# Patient Record
Sex: FEMALE | Race: WHITE | HISPANIC OR LATINO | Employment: FULL TIME | URBAN - NONMETROPOLITAN AREA
[De-identification: names, ages, dates, MRNs, and addresses within clinical notes are randomized per-mention and may not be internally consistent; named-entity substitution may affect disease eponyms.]

---

## 2017-02-15 ENCOUNTER — HOSPITAL ENCOUNTER (OUTPATIENT)
Dept: MAMMOGRAPHY | Facility: HOSPITAL | Age: 47
Discharge: HOME | End: 2017-02-15
Admitting: NURSE PRACTITIONER

## 2017-02-15 DIAGNOSIS — Z12.31 ENCOUNTER FOR SCREENING MAMMOGRAM FOR BREAST CANCER: Primary | ICD-10-CM

## 2017-02-15 DIAGNOSIS — Z12.31 ENCOUNTER FOR SCREENING MAMMOGRAM FOR MALIGNANT NEOPLASM OF BREAST: ICD-10-CM

## 2017-02-15 PROCEDURE — G0202 SCR MAMMO BI INCL CAD: HCPCS

## 2017-02-15 PROCEDURE — 77063 BREAST TOMOSYNTHESIS BI: CPT

## 2019-07-29 ENCOUNTER — TRANSCRIBE ORDERS (OUTPATIENT)
Dept: ADMINISTRATIVE | Facility: HOSPITAL | Age: 49
End: 2019-07-29

## 2019-07-29 DIAGNOSIS — Z12.39 BREAST SCREENING: Primary | ICD-10-CM

## 2019-11-19 ENCOUNTER — HOSPITAL ENCOUNTER (OUTPATIENT)
Dept: MAMMOGRAPHY | Facility: HOSPITAL | Age: 49
Discharge: HOME | End: 2019-11-19
Admitting: NURSE PRACTITIONER

## 2019-11-19 ENCOUNTER — OFFICE VISIT (OUTPATIENT)
Dept: OBSTETRICS AND GYNECOLOGY | Facility: CLINIC | Age: 49
End: 2019-11-19

## 2019-11-19 VITALS
DIASTOLIC BLOOD PRESSURE: 78 MMHG | WEIGHT: 276 LBS | SYSTOLIC BLOOD PRESSURE: 128 MMHG | BODY MASS INDEX: 47.12 KG/M2 | HEIGHT: 64 IN

## 2019-11-19 DIAGNOSIS — Z01.419 WELL WOMAN EXAM WITH ROUTINE GYNECOLOGICAL EXAM: Primary | ICD-10-CM

## 2019-11-19 DIAGNOSIS — Z12.4 CERVICAL CANCER SCREENING: ICD-10-CM

## 2019-11-19 DIAGNOSIS — Z12.39 BREAST SCREENING: ICD-10-CM

## 2019-11-19 PROCEDURE — 77063 BREAST TOMOSYNTHESIS BI: CPT

## 2019-11-19 PROCEDURE — G0123 SCREEN CERV/VAG THIN LAYER: HCPCS | Performed by: NURSE PRACTITIONER

## 2019-11-19 PROCEDURE — 87624 HPV HI-RISK TYP POOLED RSLT: CPT | Performed by: NURSE PRACTITIONER

## 2019-11-19 PROCEDURE — 77067 SCR MAMMO BI INCL CAD: CPT

## 2019-11-19 PROCEDURE — 99396 PREV VISIT EST AGE 40-64: CPT | Performed by: NURSE PRACTITIONER

## 2019-11-19 NOTE — PROGRESS NOTES
Chrissy Willis is a 49 y.o. female  YOB: 1970        Chief Complaint   Patient presents with   • Gynecologic Exam     Patient here for yearly exam. Last pap was done 12/13/16 which was normal. Patient had mammogram done 11/19/16 at [facility], which was normal. Patient has had a supracervical hysterectomy due to endometriosis and bleeding issues. Patient has c/o of MOLLY.        Gynecologic Exam   The patient's pertinent negatives include no pelvic pain. Pertinent negatives include no abdominal pain, back pain, constipation, diarrhea, dysuria, fever, frequency, hematuria, nausea, rash, sore throat, urgency or vomiting.       The following portions of the patient's history were reviewed and updated as appropriate: allergies, current medications, past family history, past medical history, past social history, past surgical history and problem list.    Allergies   Allergen Reactions   • Codeine Unknown - Low Severity   • Penicillins Unknown - Low Severity       Past Medical History:   Diagnosis Date   • Anxiety    • Diabetes mellitus (CMS/HCC)    • Hypertension        Family History   Problem Relation Age of Onset   • Breast cancer Mother    • Breast cancer Maternal Aunt    • Breast cancer Maternal Aunt    • Pancreatic cancer Maternal Uncle    • Ovarian cancer Neg Hx    • Colon cancer Neg Hx        Social History     Socioeconomic History   • Marital status:      Spouse name: Not on file   • Number of children: Not on file   • Years of education: Not on file   • Highest education level: Not on file   Tobacco Use   • Smoking status: Former Smoker   • Smokeless tobacco: Never Used   Substance and Sexual Activity   • Alcohol use: No   • Drug use: No         Current Outpatient Medications:   •  cetirizine (zyrTEC) 10 MG tablet, Take 10 mg by mouth Daily., Disp: , Rfl:   •  Cholecalciferol (VITAMIN D3) 10155 UNITS tablet, Take 1 tablet/day by mouth Daily., Disp: , Rfl:   •  FLUoxetine (PROzac)  40 MG capsule, , Disp: , Rfl:   •  metFORMIN (GLUCOPHAGE) 500 MG tablet, Take 1 tablet by mouth 2 (Two) Times a Day With Meals., Disp: 60 tablet, Rfl: 2  •  triamterene-hydrochlorothiazide (MAXZIDE-25) 37.5-25 MG per tablet, , Disp: , Rfl:     No LMP recorded. Patient has had a hysterectomy.    Sexual History:         Could not be calculated    Past Surgical History:   Procedure Laterality Date   • DILATATION AND CURETTAGE     • HYSTERECTOMY SUPRACERVICAL      due to endometriosis and bleeding.    • LAPAROSCOPIC SALPINGOOPHERECTOMY Left     still has right ovary       Review of Systems   Constitutional: Negative for activity change, appetite change, fatigue, fever, unexpected weight gain and unexpected weight loss.   HENT: Negative for congestion, ear pain, hearing loss, nosebleeds, rhinorrhea, sore throat, tinnitus and trouble swallowing.    Eyes: Negative for blurred vision, pain, discharge, itching and visual disturbance.   Respiratory: Negative for apnea, chest tightness, shortness of breath and wheezing.    Cardiovascular: Negative for chest pain and leg swelling.   Gastrointestinal: Negative for abdominal pain, blood in stool, constipation, diarrhea, nausea, vomiting and GERD.   Endocrine: Negative for heat intolerance, polydipsia and polyuria.   Genitourinary: Negative for breast lump, decreased libido, difficulty urinating, dyspareunia, dysuria, frequency, genital sores, hematuria, menstrual problem, pelvic pain, urgency, urinary incontinence and vaginal pain.   Musculoskeletal: Negative for arthralgias, back pain, joint swelling and myalgias.   Skin: Negative for color change, rash and skin lesions.   Allergic/Immunologic: Negative for environmental allergies, food allergies and immunocompromised state.   Neurological: Negative for dizziness, tremors, seizures, syncope, facial asymmetry, numbness and headache.   Hematological: Negative for adenopathy. Does not bruise/bleed easily.   Psychiatric/Behavioral:  Negative for agitation, hallucinations, sleep disturbance, suicidal ideas and depressed mood. The patient is not nervous/anxious.        Objective   Physical Exam   Constitutional: She is oriented to person, place, and time. She appears well-developed and well-nourished.   HENT:   Head: Normocephalic and atraumatic.   Right Ear: External ear normal.   Left Ear: External ear normal.   Eyes: Conjunctivae and EOM are normal. Right eye exhibits no discharge. Left eye exhibits no discharge. No scleral icterus.   Neck: Normal range of motion. Neck supple. Carotid bruit is not present. No thyromegaly present.   Cardiovascular: Regular rhythm and normal heart sounds.   No murmur heard.  Pulmonary/Chest: Effort normal and breath sounds normal. No respiratory distress. Right breast exhibits no inverted nipple, no mass, no nipple discharge, no skin change and no tenderness. Left breast exhibits no inverted nipple, no mass, no nipple discharge, no skin change and no tenderness. Breasts are symmetrical. There is no breast swelling.   Abdominal: Soft. Bowel sounds are normal. She exhibits no distension and no mass. There is no tenderness. There is no guarding. No hernia. Hernia confirmed negative in the right inguinal area and confirmed negative in the left inguinal area.   Genitourinary: Vagina normal. Rectal exam shows no mass. No breast tenderness, discharge or bleeding. There is no rash, tenderness, lesion or injury on the right labia. There is no rash, tenderness, lesion or injury on the left labia. No erythema or bleeding in the vagina. No signs of injury around the vagina. No vaginal discharge found.   Genitourinary Comments: Cervix, Uterus and Adnexa are surgically absent.  Urethra and urethral meatus normal  Bladder, normal no prolapse  Perineum and anus examined and without lesions   Musculoskeletal: Normal range of motion. She exhibits no edema or tenderness.   Lymphadenopathy:        Head (right side): No submental,  "no submandibular, no tonsillar, no preauricular, no posterior auricular and no occipital adenopathy present.        Head (left side): No submental, no submandibular, no tonsillar, no preauricular, no posterior auricular and no occipital adenopathy present.     She has no cervical adenopathy.        Right cervical: No superficial cervical, no deep cervical and no posterior cervical adenopathy present.       Left cervical: No superficial cervical, no deep cervical and no posterior cervical adenopathy present.     She has no axillary adenopathy.        Right: No inguinal adenopathy present.        Left: No inguinal adenopathy present.   Neurological: She is alert and oriented to person, place, and time. She exhibits normal muscle tone. Coordination normal.   Skin: Skin is warm and dry. No bruising and no rash noted. No erythema.   Psychiatric: She has a normal mood and affect. Her behavior is normal. Judgment and thought content normal.   Nursing note and vitals reviewed.        Vitals:    11/19/19 0953   BP: 128/78   Weight: 125 kg (276 lb)   Height: 162.6 cm (64\")       Radha was seen today for gynecologic exam.    Diagnoses and all orders for this visit:    Well woman exam with routine gynecological exam  Comments:  Normal well woman exam.  ThinPrep Pap smear done.  Mammogram was done this morning-results already in chart and is normal.  Orders:  -     Liquid-based Pap Smear, Screening    Cervical cancer screening  Comments:  ThinPrep Pap smear done.  Orders:  -     Liquid-based Pap Smear, Screening          Patient's Body mass index is 47.38 kg/m². BMI is above normal parameters. Recommendations include: exercise counseling and nutrition counseling.             Non-Smoker    MyChart Instructions Given       "

## 2019-11-21 LAB
GEN CATEG CVX/VAG CYTO-IMP: NORMAL
HPV I/H RISK 4 DNA CVX QL PROBE+SIG AMP: NOT DETECTED
LAB AP CASE REPORT: NORMAL
LAB AP GYN ADDITIONAL INFORMATION: NORMAL
PATH INTERP SPEC-IMP: NORMAL
STAT OF ADQ CVX/VAG CYTO-IMP: NORMAL

## 2020-01-15 NOTE — PROGRESS NOTES
Name:  Radha Willis  YOB: 1970  Location: [facility]  Location Address: [address] [place] [address], [facility], [address] [place] [address]  Location Phone: [phone]    Chief Complaint  Chief Complaint   Patient presents with   • Skin Lesion     right ear, scalp       History of Present Illness  The patient  is a 49 y.o. female who is referred by Wilda Ford MD for preoperative evaluation. She complains of a lesion of the right central forehead and the right inferior posterior helix present for >5 year(s). They have not been  biopsied.      She is without other complaints    I have personally reviewed the information imported into the chart during this visit.  Prior documentation that information has been reviewed in the imported note or documented material should not be taken to imply that review during this visit has not occurred.    I have personally reviewed the review of systems.                   Past Medical History:   Diagnosis Date   • Anxiety    • Diabetes mellitus (CMS/HCC)    • Hypertension        Past Surgical History:   Procedure Laterality Date   • DILATATION AND CURETTAGE     • HYSTERECTOMY SUPRACERVICAL      due to endometriosis and bleeding.    • LAPAROSCOPIC SALPINGOOPHERECTOMY Left     still has right ovary         Current Outpatient Medications:   •  cetirizine (zyrTEC) 10 MG tablet, Take 10 mg by mouth Daily., Disp: , Rfl:   •  Cholecalciferol (VITAMIN D3) 66258 UNITS tablet, Take 1 tablet/day by mouth Daily., Disp: , Rfl:   •  FLUoxetine (PROzac) 40 MG capsule, , Disp: , Rfl:   •  metFORMIN (GLUCOPHAGE) 500 MG tablet, Take 1 tablet by mouth 2 (Two) Times a Day With Meals., Disp: 60 tablet, Rfl: 2  •  triamterene-hydrochlorothiazide (MAXZIDE-25) 37.5-25 MG per tablet, , Disp: , Rfl:     Codeine and Penicillins    Family History   Problem Relation Age of Onset   • Breast cancer Mother    • Cancer Mother    • Diabetes Mother    • Breast cancer Maternal Aunt    •  Breast cancer Maternal Aunt    • Pancreatic cancer Maternal Uncle    • Ovarian cancer Neg Hx    • Colon cancer Neg Hx        Social History     Socioeconomic History   • Marital status:      Spouse name: Not on file   • Number of children: Not on file   • Years of education: Not on file   • Highest education level: Not on file   Tobacco Use   • Smoking status: Former Smoker     Last attempt to quit:      Years since quittin.0   • Smokeless tobacco: Never Used   Substance and Sexual Activity   • Alcohol use: No   • Drug use: No       Review of Systems   Constitutional: Negative.    HENT: Negative.    Eyes: Negative.    Respiratory: Negative.    Cardiovascular: Negative.    Gastrointestinal: Negative.    Endocrine: Negative.    Genitourinary: Negative.    Musculoskeletal: Negative.    Skin:        lesion of the right central forehead and the right inferior posterior helix    Allergic/Immunologic: Negative.    Hematological: Negative.    Psychiatric/Behavioral: Negative.        Vitals:    20 1035   BP: 132/70   Pulse: 93   Temp: 96.6 °F (35.9 °C)       Objective     Physical Exam  CONSTITUTIONAL: well nourished, well-developed, alert, oriented, in no acute distress     COMMUNICATION AND VOICE: able to communicate normally, normal voice quality    HEAD: normocephalic, 5 x 4 mm round papule right frontal scalp, atraumatic, no tenderness, no masses     FACE: appearance normal, no lesions, no tenderness, no deformities, facial motion symmetric    EYES: ocular motility normal, eyelids normal, orbits normal, no proptosis, conjunctiva normal , pupils equal, round     EARS:  Hearing: hearing to conversational voice intact bilaterally   External Ears: normal bilaterally, 4 x 3 mm palpable pigmented lesion right inferior helical rim    NOSE:  External Nose: external nasal structure normal, no tenderness on palpation, no nasal discharge, no lesions, no evidence of trauma, nostrils patent     ORAL:  Lips:  "upper and lower lips without lesion     NECK:  Inspection and Palpation: neck appearance normal, no masses or tenderness    CHEST/RESPIRATORY: normal respiratory effort     CARDIOVASCULAR: no cyanosis or edema     NEUROLOGICAL/PSYCHIATRIC: oriented to time, place and person, mood normal, affect appropriate, CN II-XII intact grossly       Assessment/Plan   Radha was seen today for skin lesion.    Diagnoses and all orders for this visit:    Neoplasm of uncertain behavior  Comments:  Right frontal scalp  Right inferior helical rim      * Surgery not found *  No orders of the defined types were placed in this encounter.    Return for postop.       Patient Instructions   The risks, benefits and options of the procedure were explained to the patient. The possiblity of a persistent cosmetic defect as well as a \"flap\" or a graft to close the defect was discussed. The patient was instructed to stop all aspirin, NSAIDs and VIT E etc.  If appropriate, clearance with primary MD or specialist will be obtained preoperatively.  The patient was scheduled for a LOCAL in the office.    For instructions on the proper use, care and disposal of controled substances, ask you doctor or refer to the [place] Board of Medicine website: http://kbml.[place].gov/hb1/Pages/Considerations-For-Patient-Education.aspx            "

## 2020-01-16 ENCOUNTER — TELEPHONE (OUTPATIENT)
Dept: OTOLARYNGOLOGY | Facility: CLINIC | Age: 50
End: 2020-01-16

## 2020-01-16 ENCOUNTER — OFFICE VISIT (OUTPATIENT)
Dept: OTOLARYNGOLOGY | Facility: CLINIC | Age: 50
End: 2020-01-16

## 2020-01-16 VITALS
SYSTOLIC BLOOD PRESSURE: 132 MMHG | HEIGHT: 64 IN | HEART RATE: 93 BPM | WEIGHT: 278.6 LBS | TEMPERATURE: 96.6 F | BODY MASS INDEX: 47.56 KG/M2 | DIASTOLIC BLOOD PRESSURE: 70 MMHG

## 2020-01-16 DIAGNOSIS — D48.9 NEOPLASM OF UNCERTAIN BEHAVIOR: Primary | ICD-10-CM

## 2020-01-16 PROCEDURE — 99203 OFFICE O/P NEW LOW 30 MIN: CPT | Performed by: OTOLARYNGOLOGY

## 2020-01-16 NOTE — TELEPHONE ENCOUNTER
----- Message from Flory Atkinson RN sent at 1/16/2020 11:37 AM CST -----  Lm about ear lesion with marisabel

## 2020-03-03 ENCOUNTER — PROCEDURE VISIT (OUTPATIENT)
Dept: OTOLARYNGOLOGY | Facility: CLINIC | Age: 50
End: 2020-03-03

## 2020-03-03 DIAGNOSIS — D48.9 NEOPLASM OF UNCERTAIN BEHAVIOR: Primary | ICD-10-CM

## 2020-03-03 PROCEDURE — 11442 EXC FACE-MM B9+MARG 1.1-2 CM: CPT | Performed by: OTOLARYNGOLOGY

## 2020-03-03 PROCEDURE — 11421 EXC H-F-NK-SP B9+MARG 0.6-1: CPT | Performed by: OTOLARYNGOLOGY

## 2020-03-03 PROCEDURE — 13120 CMPLX RPR S/A/L 1.1-2.5 CM: CPT | Performed by: OTOLARYNGOLOGY

## 2020-03-03 PROCEDURE — 13151 CMPLX RPR E/N/E/L 1.1-2.5 CM: CPT | Performed by: OTOLARYNGOLOGY

## 2020-03-03 NOTE — PROGRESS NOTES
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  Following the injection of local anesthesia circumferential elliptical excision was accomplished of the lesion of the right inferior helical rim without difficulty utilizing a #15 blade.  Excision was 1.4 cm x 0.5 cm.  The lesion was 5 x 4 mm.  The margin was 0.5 mm. Wide undermining was performed with curved iris scissors in order to facilitate closure and preserve normal anatomic relationships.  There was minimal to no bleeding.    Reapproximation was accomplished with interrupted 5-0 nylon.    Attention was then turned to the right frontal scalp lesion.  Following injection an elliptical excision was accomplished of the anterior frontal scalp lesion without difficulty utilizing #15 blade.  Excision was approximately 1.9 cm x 0.7 cm the lesion was 5 x 6 mm.  The margin was 1 mm.  Wide undermining was performed with curved iris scissors in order to facilitate closure and preserve normal anatomic relationships.  Reapproximation was accomplished with interrupted 4-0 Vicryl subcutaneously and interrupted horizontal mattress of 5-0 nylon to reapproximate the epidermis..    Bacitracin ointment was applied and the procedure terminated.  The patient tolerated the procedure well. There were no complications.

## 2020-03-13 ENCOUNTER — PROCEDURE VISIT (OUTPATIENT)
Dept: OTOLARYNGOLOGY | Facility: CLINIC | Age: 50
End: 2020-03-13

## 2020-03-13 DIAGNOSIS — Z48.02 VISIT FOR SUTURE REMOVAL: Primary | ICD-10-CM

## 2020-03-13 PROCEDURE — 99024 POSTOP FOLLOW-UP VISIT: CPT | Performed by: PHYSICIAN ASSISTANT

## 2020-03-13 NOTE — PROGRESS NOTES
DUNIA Gomez   History Of Present Illness:  Radha Willis is a 50 y.o. female who presents for suture removal after surgery.    Physical exam:  sutures removed, wound healing appropriately without overt infection or inflammation        Assessment:  Postoperative Examination Following Surgery V67.00    Plan:  call for wound redness, swelling or breakdown   Wound care instructions given     DUNIA Gomez  3/13/2020   09:52

## 2020-03-17 ENCOUNTER — TELEPHONE (OUTPATIENT)
Dept: OTOLARYNGOLOGY | Facility: CLINIC | Age: 50
End: 2020-03-17

## 2020-09-18 ENCOUNTER — OFFICE VISIT (OUTPATIENT)
Age: 50
End: 2020-09-18

## 2020-09-18 VITALS — HEART RATE: 83 BPM | OXYGEN SATURATION: 98 % | TEMPERATURE: 97.1 F

## 2020-09-18 PROCEDURE — 99999 PR OFFICE/OUTPT VISIT,PROCEDURE ONLY: CPT | Performed by: NURSE PRACTITIONER

## 2020-09-21 LAB — SARS-COV-2, NAA: NOT DETECTED

## 2022-04-01 ENCOUNTER — TELEPHONE (OUTPATIENT)
Dept: OBSTETRICS AND GYNECOLOGY | Facility: CLINIC | Age: 52
End: 2022-04-01

## 2022-04-19 DIAGNOSIS — Z12.31 ENCOUNTER FOR SCREENING MAMMOGRAM FOR BREAST CANCER: Primary | ICD-10-CM

## 2022-06-01 ENCOUNTER — HOSPITAL ENCOUNTER (OUTPATIENT)
Dept: MAMMOGRAPHY | Facility: HOSPITAL | Age: 52
Discharge: HOME | End: 2022-06-01
Admitting: NURSE PRACTITIONER

## 2022-06-01 ENCOUNTER — DOCUMENTATION (OUTPATIENT)
Dept: MAMMOGRAPHY | Facility: HOSPITAL | Age: 52
End: 2022-06-01

## 2022-06-01 DIAGNOSIS — Z12.31 ENCOUNTER FOR SCREENING MAMMOGRAM FOR BREAST CANCER: ICD-10-CM

## 2022-06-01 DIAGNOSIS — Z80.3 FAMILY HISTORY OF BREAST CANCER: Primary | ICD-10-CM

## 2022-06-01 PROCEDURE — 77067 SCR MAMMO BI INCL CAD: CPT

## 2022-06-01 PROCEDURE — 77063 BREAST TOMOSYNTHESIS BI: CPT

## 2022-06-01 NOTE — PROGRESS NOTES
As part of a high-risk assessment, this patient was provided a questionnaire to assess personal and family history of cancer. Patients who meet National [employer] criteria are offered genetic testing for hereditary cancer at their appointment. If this patient qualifies and consents to genetic testing, the results and management recommendations (when applicable) will be provided to the referring provider. A [facility] breast cancer risk assessment was also calculated.  Based upon the patient's answers, the TC score was calcuated as  21.0% . Women with a 20% or higher lifetime risk are recommended to pursue annual breast MRI in addition to annual mammogram, per [employer] recommendations.

## 2022-06-08 ENCOUNTER — LAB (OUTPATIENT)
Dept: LAB | Facility: HOSPITAL | Age: 52
End: 2022-06-08

## 2022-06-08 DIAGNOSIS — Z80.3 FAMILY HISTORY OF BREAST CANCER: ICD-10-CM

## 2022-06-22 ENCOUNTER — DOCUMENTATION (OUTPATIENT)
Dept: LAB | Facility: HOSPITAL | Age: 52
End: 2022-06-22

## 2022-06-22 NOTE — PROGRESS NOTES
The results from genetic lab testing for hereditary cancer were routed to provider.

## 2023-03-15 ENCOUNTER — OFFICE VISIT (OUTPATIENT)
Dept: OBSTETRICS AND GYNECOLOGY | Facility: CLINIC | Age: 53
End: 2023-03-15
Payer: COMMERCIAL

## 2023-03-15 VITALS
BODY MASS INDEX: 49.51 KG/M2 | WEIGHT: 290 LBS | DIASTOLIC BLOOD PRESSURE: 100 MMHG | SYSTOLIC BLOOD PRESSURE: 138 MMHG | HEIGHT: 64 IN

## 2023-03-15 DIAGNOSIS — N95.1 PERIMENOPAUSAL VASOMOTOR SYMPTOMS: ICD-10-CM

## 2023-03-15 DIAGNOSIS — Z12.4 CERVICAL CANCER SCREENING: ICD-10-CM

## 2023-03-15 DIAGNOSIS — Z91.89 INCREASED RISK OF BREAST CANCER: ICD-10-CM

## 2023-03-15 DIAGNOSIS — Z12.31 ENCOUNTER FOR SCREENING MAMMOGRAM FOR MALIGNANT NEOPLASM OF BREAST: ICD-10-CM

## 2023-03-15 DIAGNOSIS — Z01.419 WELL WOMAN EXAM WITH ROUTINE GYNECOLOGICAL EXAM: Primary | ICD-10-CM

## 2023-03-15 PROCEDURE — 99386 PREV VISIT NEW AGE 40-64: CPT | Performed by: NURSE PRACTITIONER

## 2023-03-15 PROCEDURE — G0123 SCREEN CERV/VAG THIN LAYER: HCPCS | Performed by: NURSE PRACTITIONER

## 2023-03-15 PROCEDURE — 87624 HPV HI-RISK TYP POOLED RSLT: CPT | Performed by: NURSE PRACTITIONER

## 2023-03-15 PROCEDURE — 99213 OFFICE O/P EST LOW 20 MIN: CPT | Performed by: NURSE PRACTITIONER

## 2023-03-15 RX ORDER — LOVASTATIN 20 MG/1
TABLET ORAL
COMMUNITY
Start: 2022-12-28

## 2023-03-15 RX ORDER — OMEPRAZOLE 20 MG/1
CAPSULE, DELAYED RELEASE ORAL
COMMUNITY
Start: 2022-12-28

## 2023-03-15 RX ORDER — LOSARTAN POTASSIUM 50 MG/1
TABLET ORAL
COMMUNITY
Start: 2022-12-28

## 2023-03-15 NOTE — PROGRESS NOTES
Subjective   Radha Willis is a 53 y.o. female  YOB: 1970        Chief Complaint   Patient presents with   • Gynecologic Exam     New patient here for annual, patient has had a hysterectomy (patient states that she has part of her cervix and 1 ovary), last mammogram 22, last colonoscopy 2022. Patient denies any problems or concerns       Gynecologic Exam  The patient's pertinent negatives include no pelvic pain. Pertinent negatives include no abdominal pain, back pain, constipation, diarrhea, dysuria, fever, frequency, hematuria, nausea, rash, sore throat, urgency or vomiting.       The following portions of the patient's history were reviewed and updated as appropriate: allergies, current medications, past family history, past medical history, past social history, past surgical history, and problem list.    Allergies   Allergen Reactions   • Shellfish-Derived Products Anaphylaxis   • Codeine Unknown - Low Severity   • Lisinopril Unknown - High Severity   • Penicillins Unknown - Low Severity       Past Medical History:   Diagnosis Date   • Anxiety    • Diabetes mellitus (HCC)    • Endometriosis    • Female infertility    • Hypertension    • Migraine    • Ovarian cyst    • Polycystic ovary syndrome    • Rh incompatibility        Family History   Problem Relation Age of Onset   • Breast cancer Mother    • Cancer Mother    • Diabetes Mother    • Breast cancer Maternal Aunt    • Breast cancer Maternal Aunt    • Pancreatic cancer Maternal Uncle    • Colon cancer Maternal Uncle         Agent Orange related   • Diabetes Maternal Uncle    • Colon cancer Maternal Uncle    • Prostate cancer Maternal Uncle    • Ovarian cancer Neg Hx        Social History     Socioeconomic History   • Marital status:    Tobacco Use   • Smoking status: Former     Packs/day: 0.50     Years: 20.00     Pack years: 10.00     Types: Cigarettes     Quit date: 2009     Years since quittin.9   •  Smokeless tobacco: Never   Substance and Sexual Activity   • Alcohol use: No   • Drug use: No   • Sexual activity: Yes     Partners: Male     Birth control/protection: Hysterectomy         Current Outpatient Medications:   •  cetirizine (zyrTEC) 10 MG tablet, Take 1 tablet by mouth Daily., Disp: , Rfl:   •  Cholecalciferol (VITAMIN D3) 58543 UNITS tablet, Take 1 tablet by mouth Daily., Disp: , Rfl:   •  FLUoxetine (PROzac) 40 MG capsule, , Disp: , Rfl:   •  losartan (COZAAR) 50 MG tablet, TAKE 1 TABLET BY MOUTH EVERY DAY FOR 90 DAYS, Disp: , Rfl:   •  lovastatin (MEVACOR) 20 MG tablet, TAKE 1 TABLET BY MOUTH EVERY DAY WITH THE EVENING MEAL FOR 90 DAYS, Disp: , Rfl:   •  metFORMIN (GLUCOPHAGE) 500 MG tablet, Take 1 tablet by mouth 2 (Two) Times a Day With Meals., Disp: 60 tablet, Rfl: 2  •  omeprazole (priLOSEC) 20 MG capsule, TAKE 1 CAPSULE BY MOUTH EVERY DAY 30 MINUTES BEFORE MORNING MEAL FOR 90 DAYS, Disp: , Rfl:   •  Ubiquinol 100 MG capsule, See Admin Instructions., Disp: , Rfl:     No LMP recorded. Patient has had a hysterectomy.    Sexual History:           Could not be calculated    Past Surgical History:   Procedure Laterality Date   • D & C WITH SUCTION  1999   • HYSTERECTOMY SUPRACERVICAL      due to endometriosis and bleeding.    • LAPAROSCOPIC SALPINGOOPHERECTOMY Left     still has right ovary   • OVARIAN CYST SURGERY  1993    right ovary   • TOTAL ABDOMINAL HYSTERECTOMY  2007       Review of Systems   Constitutional: Negative for activity change, appetite change, fatigue, fever, unexpected weight gain and unexpected weight loss.   HENT: Negative for congestion, ear pain, hearing loss, nosebleeds, rhinorrhea, sore throat, tinnitus and trouble swallowing.    Eyes: Negative for blurred vision, pain, discharge, itching and visual disturbance.   Respiratory: Negative for apnea, chest tightness, shortness of breath and wheezing.    Cardiovascular: Negative for chest pain and leg swelling.   Gastrointestinal:  Negative for abdominal pain, blood in stool, constipation, diarrhea, nausea, vomiting and GERD.   Endocrine: Negative for heat intolerance, polydipsia and polyuria.   Genitourinary: Negative.  Negative for breast lump, decreased libido, difficulty urinating, dyspareunia, dysuria, frequency, genital sores, hematuria, menstrual problem, pelvic pain, urgency, urinary incontinence and vaginal pain.   Musculoskeletal: Negative for arthralgias, back pain, joint swelling and myalgias.   Skin: Negative for color change, rash and skin lesions.   Allergic/Immunologic: Negative for environmental allergies, food allergies and immunocompromised state.   Neurological: Negative for dizziness, tremors, seizures, syncope, facial asymmetry, numbness and headache.   Hematological: Negative for adenopathy. Does not bruise/bleed easily.   Psychiatric/Behavioral: Negative for agitation, hallucinations, sleep disturbance, suicidal ideas and depressed mood. The patient is not nervous/anxious.        Objective   Physical Exam  Vitals and nursing note reviewed.   Constitutional:       Appearance: She is well-developed.   HENT:      Head: Normocephalic and atraumatic.      Right Ear: External ear normal.      Left Ear: External ear normal.   Eyes:      General: No scleral icterus.        Right eye: No discharge.         Left eye: No discharge.      Conjunctiva/sclera: Conjunctivae normal.   Neck:      Thyroid: No thyromegaly.      Vascular: No carotid bruit.   Cardiovascular:      Rate and Rhythm: Regular rhythm.      Heart sounds: Normal heart sounds. No murmur heard.  Pulmonary:      Effort: Pulmonary effort is normal. No respiratory distress.      Breath sounds: Normal breath sounds.   Chest:   Breasts:     Breasts are symmetrical.      Right: No inverted nipple, mass, nipple discharge, skin change or tenderness.      Left: No inverted nipple, mass, nipple discharge, skin change or tenderness.   Abdominal:      General: Bowel sounds are  "normal. There is no distension.      Palpations: Abdomen is soft. There is no mass.      Tenderness: There is no abdominal tenderness. There is no guarding.      Hernia: No hernia is present. There is no hernia in the left inguinal area.   Genitourinary:     Labia:         Right: No rash, tenderness, lesion or injury.         Left: No rash, tenderness, lesion or injury.       Vagina: Normal. No signs of injury. No vaginal discharge, erythema or bleeding.      Rectum: No mass.      Comments: Cervix, Uterus and Adnexa are surgically absent.  Urethra and urethral meatus normal  Bladder, normal no prolapse  Perineum and anus examined and without lesions  Musculoskeletal:         General: No tenderness. Normal range of motion.      Cervical back: Normal range of motion and neck supple.   Lymphadenopathy:      Head:      Right side of head: No submental, submandibular, tonsillar, preauricular, posterior auricular or occipital adenopathy.      Left side of head: No submental, submandibular, tonsillar, preauricular, posterior auricular or occipital adenopathy.      Cervical: No cervical adenopathy.      Right cervical: No superficial, deep or posterior cervical adenopathy.     Left cervical: No superficial, deep or posterior cervical adenopathy.   Skin:     General: Skin is warm and dry.      Findings: No bruising, erythema or rash.   Neurological:      Mental Status: She is alert and oriented to person, place, and time.      Motor: No abnormal muscle tone.      Coordination: Coordination normal.   Psychiatric:         Behavior: Behavior normal.         Thought Content: Thought content normal.         Judgment: Judgment normal.           Vitals:    03/15/23 0831   BP: 138/100   BP Location: Left arm   Patient Position: Sitting   Cuff Size: Large Adult   Weight: 132 kg (290 lb)   Height: 162.6 cm (64\")       Diagnoses and all orders for this visit:    1. Well woman exam with routine gynecological exam " (Primary)  Comments:  Normal well woman exam.  History of supracervical hysterectomy.  ThinPrep Pap smear done.  Mammogram ordered.      2. Increased risk of breast cancer  Comments:  Referral to general surgery made.  Orders:  -     Ambulatory Referral to General Surgery    3. Cervical cancer screening  Comments:  ThinPrep Pap smear done.    4. Encounter for screening mammogram for malignant neoplasm of breast  Comments:  Mammogram ordered.  Orders:  -     Mammo Screening Digital Tomosynthesis Bilateral With CAD; Future    5. Perimenopausal vasomotor symptoms  Comments:  Patient reports hot flashes and night sweats.  History of hysterectomy.  Hormone panel done [id number] pending results.  Orders:  -     Cortisol  -     DHEA-Sulfate  -     Estradiol  -     Follicle Stimulating Hormone  -     Luteinizing Hormone  -     Progesterone  -     Testosterone        Normal GYN exam. Will have lab work here. Encouraged SBE.  Pt is aware how to do self breast exam and the importance of same. Discussed weight management and importance of maintaining a healthy weight. Discussed Vitamin D intake and the importance of adequate vitamin D for both bone health and a healthy immune system.  Discussed daily exercise and the importance of same in regards to a healthy heart as well as helping to maintain her weight and improving her mental health.  Body mass index is 49.78 kg/m². Colonoscopy is up to date.  Mammogram will be scheduled at [facility]. Pap smear is done per ASCCP guidelines.    Class 3 Severe Obesity (BMI >=40). Obesity-related health conditions include the following: hypertension and diabetes mellitus. Obesity is unchanged. BMI is is above average; BMI management plan is completed. We discussed portion control and increasing exercise.             Non-Smoker    MyChart Instructions Given

## 2023-03-16 LAB
CORTIS SERPL-MCNC: 12 UG/DL
DHEA-S SERPL-MCNC: 64.6 UG/DL (ref 41.2–243.7)
ESTRADIOL SERPL-MCNC: 27.4 PG/ML
FSH SERPL-ACNC: 37.3 MIU/ML
LH SERPL-ACNC: 23.6 MIU/ML
PROGEST SERPL-MCNC: <0.1 NG/ML
TESTOST SERPL-MCNC: 33 NG/DL (ref 4–50)

## 2023-03-17 LAB
GEN CATEG CVX/VAG CYTO-IMP: NORMAL
HPV I/H RISK 4 DNA CVX QL PROBE+SIG AMP: NOT DETECTED
LAB AP CASE REPORT: NORMAL
LAB AP GYN ADDITIONAL INFORMATION: NORMAL
Lab: NORMAL
PATH INTERP SPEC-IMP: NORMAL
STAT OF ADQ CVX/VAG CYTO-IMP: NORMAL

## 2023-03-21 NOTE — PROGRESS NOTES
Office New Patient History and Physical:     Referring Provider: Yahaira Forman AP*    Chief complaint: High risk of breast cancer discussion     Subjective .     History of present illness:  Radha Willis is a 53 y.o. female who is here to discuss her elevated risk of breast cancer.     Breast History information:   Tyrer-Cuzick score: 34.5%  Prior abnormal mammograms: none   Prior breast biopsies: none   Palpable breast masses: none   Nipple discharge: none   Age at first menses: 14  Age at menopause: hysterectomy in 2017, one ovary in place   Number of biological children: 0  Age at first birth: n/a   Years on birth control: 25 stella years   Years on HRT: < 6 months   Family history of breast cancer: Mother with breast cancer at age 60, maternal aunts x 2 with breast cancer around age 60   Smoking History: none   Alcohol use: none   BMI: 49     She is not on blood thinners.     History  Past Medical History:   Diagnosis Date   • Anxiety    • Diabetes mellitus (HCC)    • Endometriosis 1987   • Female infertility    • Hypertension    • Migraine    • Ovarian cyst 1987   • Polycystic ovary syndrome    • Rh incompatibility    ,   Past Surgical History:   Procedure Laterality Date   • D & C WITH SUCTION  1999   • HYSTERECTOMY SUPRACERVICAL      due to endometriosis and bleeding.    • LAPAROSCOPIC SALPINGOOPHERECTOMY Left     still has right ovary   • OVARIAN CYST SURGERY  1993    right ovary   • TOTAL ABDOMINAL HYSTERECTOMY  2007   ,   Family History   Problem Relation Age of Onset   • Breast cancer Mother    • Cancer Mother    • Diabetes Mother    • Breast cancer Maternal Aunt    • Breast cancer Maternal Aunt    • Pancreatic cancer Maternal Uncle    • Colon cancer Maternal Uncle         Agent Orange related   • Diabetes Maternal Uncle    • Colon cancer Maternal Uncle    • Prostate cancer Maternal Uncle    • Ovarian cancer Neg Hx    ,   Social History     Tobacco Use   • Smoking status: Former     Packs/day: 0.50  "    Years: 20.00     Pack years: 10.00     Types: Cigarettes     Quit date: 2009     Years since quittin.9   • Smokeless tobacco: Never   Substance Use Topics   • Alcohol use: No   • Drug use: No   , (Not in a hospital admission)   and Allergies:  Shellfish-derived products, Codeine, Lisinopril, and Penicillins    Current Outpatient Medications:   •  cetirizine (zyrTEC) 10 MG tablet, Take 1 tablet by mouth Daily., Disp: , Rfl:   •  Cholecalciferol (VITAMIN D3) 32536 UNITS tablet, Take 1 tablet by mouth Daily., Disp: , Rfl:   •  FLUoxetine (PROzac) 40 MG capsule, , Disp: , Rfl:   •  losartan (COZAAR) 50 MG tablet, TAKE 1 TABLET BY MOUTH EVERY DAY FOR 90 DAYS, Disp: , Rfl:   •  lovastatin (MEVACOR) 20 MG tablet, TAKE 1 TABLET BY MOUTH EVERY DAY WITH THE EVENING MEAL FOR 90 DAYS, Disp: , Rfl:   •  metFORMIN (GLUCOPHAGE) 500 MG tablet, Take 1 tablet by mouth 2 (Two) Times a Day With Meals., Disp: 60 tablet, Rfl: 2  •  omeprazole (priLOSEC) 20 MG capsule, TAKE 1 CAPSULE BY MOUTH EVERY DAY 30 MINUTES BEFORE MORNING MEAL FOR 90 DAYS, Disp: , Rfl:   •  Ubiquinol 100 MG capsule, See Admin Instructions., Disp: , Rfl:     Objective     Vital Signs   /100 (BP Location: Left arm, Patient Position: Sitting, Cuff Size: Adult)   Pulse 97   Ht 162.6 cm (64.02\")   Wt 132 kg (291 lb 0.1 oz)   SpO2 97%   BMI 49.93 kg/m²      Physical Exam:  General appearance - alert, well appearing, and in no distress  Mental status - alert, oriented to person, place, and time  Eyes - pupils equal and reactive, extraocular eye movements intact  Neck - supple, no significant adenopathy  Chest - no tachypnea, retractions or cyanosis  Heart - normal rate and regular rhythm  Abdomen - soft, nontender, nondistended, no masses or organomegaly  Neurological - alert, oriented, normal speech, no focal findings or movement disorder noted  Musculoskeletal - no joint tenderness, deformity or swelling  Breast Exam: Bilateral breasts without " obvious deformities. Bilateral nipples everted. Patient examined in the supine position with the ipsilateral arm above the head. Very dense breasts. No palpable masses bilaterally. No nipple discharge bilaterally. No palpable axillary nor supraclavicular adenopathy bilaterally.     Results Review:    The following data was reviewed by: Kelly Rogers MD on 03/22/2023:    Progress Notes by Mary Aquino R.T.(RICHARD) (06/01/2022 12:34)  Progress Notes by Mary Aquino R.T.(RICHARD) (06/22/2022 07:17)  Variant of unknown significance detected   Mammo Screening Digital Tomosynthesis Bilateral With CAD (06/01/2022 09:30)  No mammographic evidence of malignancy. Recommendation is for the  patient to return for routine mammography in one year or sooner if  clinically indicated.     Tyrer-Cuzick lifetime risk score of 34.5 percent.  NCCN guidelines for  genetic testing met. American Cancer Society guidelines recommend  surveillance screening MRI for women with estimated lifetime risk of  breast cancer greater than 20 percent.       BIRADS Category 2 - Benign findings    Assessment & Plan       Diagnoses and all orders for this visit:    1. Family history of breast cancer (Primary)  -     MRI Breast Bilateral With & Without Contrast; Future  -     Mammo Screening Bilateral With CAD; Future    2. At increased risk of breast cancer  -     MRI Breast Bilateral With & Without Contrast; Future  -     Mammo Screening Bilateral With CAD; Future    3. Obesity, Class III, BMI 40-49.9 (morbid obesity) (HCC)         Radha Willis is a 53 y.o. female with a high risk of breast cancer based on her Tyrer Cuzick score of 34.5%. 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  I went over risk reduction and early detection strategies with her. (1) Early detection: twice yearly clinical breast exam. Yearly bilateral breast MRI and yearly bilateral mammogram so there is imaging every 6 months. (2) Prevention: First step of  prevention is lifestyle modification with healthy diet, maintaining a healthy BMI, smoking cessation (including vaping), regular exercise, <2 alcoholic drinks per day. I highly encouraged smoking cessation to decrease her risk. The second option on prevention is chemoprophylaxis with anti-hormone therapy. We discussed the potential 30-40% risk reduction with medication. I offered her a referral to medical oncology to discuss risks and benefits in detail and she elected to defer at this time. Third option is surgical prevention with bilateral prophylactic mastectomies with or without reconstruction. We discussed that this is a big operation and will require 1-2 months of recovery with risks of bleeding, infection, damage to surrounding structures. She understands that bilateral mastectomy does not decrease her risk to 0%. She has elected to defer at this time.     She sees her obgyn in March. I will see her in September after her mammogram. High risk screening with alternating MRI and mammogram. MRI now - I will call her with the results. Mammogram ordered for 6 months from now.     I have reviewed mammogram and genetic testing above. I have ordered her MRI and mammogram.     Class 3 Severe Obesity (BMI >=40). Obesity-related health conditions include the following: hypertension, dyslipidemias and GERD. Obesity is unchanged. BMI is is above average; BMI management plan is completed. We discussed portion control and increasing exercise.    Kelly Rogers MD  03/22/23  12:27 CDT

## 2023-03-21 NOTE — PATIENT INSTRUCTIONS

## 2023-03-22 ENCOUNTER — OFFICE VISIT (OUTPATIENT)
Dept: SURGERY | Facility: CLINIC | Age: 53
End: 2023-03-22
Payer: COMMERCIAL

## 2023-03-22 VITALS
DIASTOLIC BLOOD PRESSURE: 100 MMHG | BODY MASS INDEX: 49.68 KG/M2 | OXYGEN SATURATION: 97 % | HEIGHT: 64 IN | HEART RATE: 97 BPM | SYSTOLIC BLOOD PRESSURE: 138 MMHG | WEIGHT: 291.01 LBS

## 2023-03-22 DIAGNOSIS — E66.01 OBESITY, CLASS III, BMI 40-49.9 (MORBID OBESITY): ICD-10-CM

## 2023-03-22 DIAGNOSIS — Z91.89 AT INCREASED RISK OF BREAST CANCER: ICD-10-CM

## 2023-03-22 DIAGNOSIS — Z80.3 FAMILY HISTORY OF BREAST CANCER: Primary | ICD-10-CM

## 2023-03-22 PROCEDURE — 99203 OFFICE O/P NEW LOW 30 MIN: CPT | Performed by: STUDENT IN AN ORGANIZED HEALTH CARE EDUCATION/TRAINING PROGRAM

## 2023-04-05 ENCOUNTER — HOSPITAL ENCOUNTER (OUTPATIENT)
Dept: MRI IMAGING | Facility: HOSPITAL | Age: 53
Discharge: HOME | End: 2023-04-05
Payer: COMMERCIAL

## 2023-04-05 DIAGNOSIS — Z80.3 FAMILY HISTORY OF BREAST CANCER: ICD-10-CM

## 2023-04-05 DIAGNOSIS — Z91.89 AT INCREASED RISK OF BREAST CANCER: ICD-10-CM

## 2023-08-10 DIAGNOSIS — Z80.3 FAMILY HISTORY OF BREAST CANCER: Primary | ICD-10-CM

## 2024-03-13 ENCOUNTER — TRANSCRIBE ORDERS (OUTPATIENT)
Dept: ADMINISTRATIVE | Facility: HOSPITAL | Age: 54
End: 2024-03-13
Payer: COMMERCIAL

## 2024-03-13 DIAGNOSIS — Z12.31 ENCOUNTER FOR SCREENING MAMMOGRAM FOR MALIGNANT NEOPLASM OF BREAST: Primary | ICD-10-CM

## 2024-03-20 ENCOUNTER — OFFICE VISIT (OUTPATIENT)
Dept: OBSTETRICS AND GYNECOLOGY | Age: 54
End: 2024-03-20
Payer: COMMERCIAL

## 2024-03-20 ENCOUNTER — HOSPITAL ENCOUNTER (OUTPATIENT)
Dept: MAMMOGRAPHY | Facility: HOSPITAL | Age: 54
Discharge: HOME | End: 2024-03-20
Admitting: NURSE PRACTITIONER
Payer: COMMERCIAL

## 2024-03-20 VITALS
HEIGHT: 64 IN | DIASTOLIC BLOOD PRESSURE: 86 MMHG | SYSTOLIC BLOOD PRESSURE: 128 MMHG | BODY MASS INDEX: 46.44 KG/M2 | WEIGHT: 272 LBS

## 2024-03-20 DIAGNOSIS — N89.8 VAGINAL DISCHARGE: ICD-10-CM

## 2024-03-20 DIAGNOSIS — Z01.419 WELL WOMAN EXAM WITH ROUTINE GYNECOLOGICAL EXAM: Primary | ICD-10-CM

## 2024-03-20 DIAGNOSIS — Z12.31 ENCOUNTER FOR SCREENING MAMMOGRAM FOR MALIGNANT NEOPLASM OF BREAST: ICD-10-CM

## 2024-03-20 DIAGNOSIS — Z12.31 ENCOUNTER FOR SCREENING MAMMOGRAM FOR BREAST CANCER: ICD-10-CM

## 2024-03-20 PROCEDURE — 77063 BREAST TOMOSYNTHESIS BI: CPT

## 2024-03-20 PROCEDURE — 77067 SCR MAMMO BI INCL CAD: CPT

## 2024-03-20 RX ORDER — SEMAGLUTIDE 0.68 MG/ML
INJECTION, SOLUTION SUBCUTANEOUS
COMMUNITY
Start: 2024-03-07

## 2024-03-20 RX ORDER — AMLODIPINE BESYLATE 5 MG/1
1 TABLET ORAL DAILY
COMMUNITY
Start: 2024-01-14

## 2024-03-20 RX ORDER — MELOXICAM 15 MG/1
1 TABLET ORAL DAILY
COMMUNITY
Start: 2024-01-24

## 2024-03-20 RX ORDER — BLOOD-GLUCOSE SENSOR
EACH MISCELLANEOUS SEE ADMIN INSTRUCTIONS
COMMUNITY
Start: 2024-03-08

## 2024-03-20 NOTE — PROGRESS NOTES
"Chief Complaint   Patient presents with    Gynecologic Exam     Patient here for annual, patient has had a hysterectomy, last mammogram 6/1/22 and has one scheduled this afternoon. Patient reports \"vaginal lymph nodes\" being swollen and has been for at least a week.        Subjective     Radha Willis is a 54 y.o. female    History of Present Illness  Pt comes in today to get up to date on annual wellness exam. Had felt that her lymph nodes or something in right groin area were tender or enlarged previously within last week. Hasn't checked today.    /86 (BP Location: Left arm, Patient Position: Sitting, Cuff Size: Adult)   Ht 162.6 cm (64\")   Wt 123 kg (272 lb)   BMI 46.69 kg/m²     Outpatient Encounter Medications as of 3/20/2024   Medication Sig Dispense Refill    amLODIPine (NORVASC) 5 MG tablet Take 1 tablet by mouth Daily.      cetirizine (zyrTEC) 10 MG tablet Take 1 tablet by mouth Daily.      Continuous Blood Gluc Sensor (911 PetsStyle Rebekah 3 Sensor) misc See Admin Instructions.      FLUoxetine (PROzac) 40 MG capsule       losartan (COZAAR) 50 MG tablet TAKE 1 TABLET BY MOUTH EVERY DAY FOR 90 DAYS      meloxicam (MOBIC) 15 MG tablet Take 1 tablet by mouth Daily.      omeprazole (priLOSEC) 20 MG capsule TAKE 1 CAPSULE BY MOUTH EVERY DAY 30 MINUTES BEFORE MORNING MEAL FOR 90 DAYS      Ozempic, 0.25 or 0.5 MG/DOSE, 2 MG/3ML solution pen-injector INJECT 0.25 MG UNDER THE SKIN ONCE A WEEK FOR 4 WEEKS THEN 0.5 MG WEEKLY      [DISCONTINUED] Cholecalciferol (VITAMIN D3) 44055 UNITS tablet Take 1 tablet by mouth Daily.      [DISCONTINUED] lovastatin (MEVACOR) 20 MG tablet TAKE 1 TABLET BY MOUTH EVERY DAY WITH THE EVENING MEAL FOR 90 DAYS      [DISCONTINUED] metFORMIN (GLUCOPHAGE) 500 MG tablet Take 1 tablet by mouth 2 (Two) Times a Day With Meals. 60 tablet 2    [DISCONTINUED] Ubiquinol 100 MG capsule See Admin Instructions.       No facility-administered encounter medications on file as of 3/20/2024. "       Past Medical History  Past Medical History:   Diagnosis Date    Anxiety     Diabetes mellitus     Endometriosis 1987    Female infertility     Hypertension     Migraine     Ovarian cyst 1987    Polycystic ovary syndrome     Rh incompatibility         Surgical History  Past Surgical History:   Procedure Laterality Date    D & C WITH SUCTION  1999    HYSTERECTOMY SUPRACERVICAL      due to endometriosis and bleeding.     LAPAROSCOPIC SALPINGOOPHERECTOMY Left     still has right ovary    OVARIAN CYST SURGERY  1993    right ovary    TOTAL ABDOMINAL HYSTERECTOMY  2007       Family History  Family History   Problem Relation Age of Onset    Breast cancer Mother     Cancer Mother     Diabetes Mother     Breast cancer Maternal Aunt     Breast cancer Maternal Aunt     Pancreatic cancer Maternal Uncle     Colon cancer Maternal Uncle         Agent Orange related    Diabetes Maternal Uncle     Colon cancer Maternal Uncle     Prostate cancer Maternal Uncle     Ovarian cancer Neg Hx        The following portions of the patient's history were reviewed and updated as appropriate: allergies, current medications, past family history, past medical history, past social history, past surgical history, and problem list.    Review of Systems   Constitutional:  Negative for activity change and unexpected weight loss.   Cardiovascular:  Negative for chest pain.   Gastrointestinal:  Negative for blood in stool, constipation and diarrhea.   Endocrine: Negative for cold intolerance and heat intolerance.   Genitourinary:  Positive for vaginal pain. Negative for dyspareunia, pelvic pain and vaginal discharge.   Musculoskeletal:  Negative for arthralgias, back pain, neck pain and neck stiffness.   Skin:  Negative for rash.   Neurological:  Negative for dizziness and headache.   Psychiatric/Behavioral:  Negative for sleep disturbance. The patient is not nervous/anxious.        Objective   Physical Exam  Vitals and nursing note reviewed. Exam  conducted with a chaperone present.   Constitutional:       Appearance: She is well-developed. She is obese.   HENT:      Head: Normocephalic and atraumatic.      Right Ear: External ear normal.      Left Ear: External ear normal.   Eyes:      General: No scleral icterus.        Right eye: No discharge.         Left eye: No discharge.      Conjunctiva/sclera: Conjunctivae normal.   Neck:      Thyroid: No thyromegaly.      Vascular: No carotid bruit.   Cardiovascular:      Rate and Rhythm: Regular rhythm.      Heart sounds: Normal heart sounds. No murmur heard.  Pulmonary:      Effort: Pulmonary effort is normal. No respiratory distress.      Breath sounds: Normal breath sounds.   Chest:   Breasts:     Breasts are symmetrical.      Right: No inverted nipple, mass, nipple discharge, skin change or tenderness.      Left: No inverted nipple, mass, nipple discharge, skin change or tenderness.   Abdominal:      General: Bowel sounds are normal. There is no distension.      Palpations: Abdomen is soft. There is no mass.      Tenderness: There is no abdominal tenderness. There is no guarding.      Hernia: No hernia is present. There is no hernia in the left inguinal area.   Genitourinary:     Labia:         Right: No rash, tenderness, lesion or injury.         Left: No rash, tenderness, lesion or injury.       Vagina: Normal. No signs of injury. No vaginal discharge, erythema or bleeding.      Cervix: Normal.      Adnexa: Right adnexa normal.      Rectum: No mass.      Comments: Uterus and Adnexa are surgically absent.  Urethra and urethral meatus normal  Bladder, normal no prolapse  Perineum and anus examined and without lesions      Musculoskeletal:         General: No tenderness. Normal range of motion.      Cervical back: Normal range of motion and neck supple.   Lymphadenopathy:      Head:      Right side of head: No submental, submandibular, tonsillar, preauricular, posterior auricular or occipital adenopathy.       Left side of head: No submental, submandibular, tonsillar, preauricular, posterior auricular or occipital adenopathy.      Cervical: No cervical adenopathy.      Right cervical: No superficial, deep or posterior cervical adenopathy.     Left cervical: No superficial, deep or posterior cervical adenopathy.   Skin:     General: Skin is warm and dry.      Findings: No bruising, erythema or rash.   Neurological:      Mental Status: She is alert and oriented to person, place, and time.      Motor: No abnormal muscle tone.      Coordination: Coordination normal.   Psychiatric:         Behavior: Behavior normal.         Thought Content: Thought content normal.         Judgment: Judgment normal.       Assessment & Plan   Diagnoses and all orders for this visit:    1. Well woman exam with routine gynecological exam (Primary)    2. Vaginal discharge  -     NuSwab VG+ - Swab, Vagina    3. Encounter for screening mammogram for breast cancer         Normal GYN exam. Encouraged SBE, pt is aware how to do self breast exam and the importance of same. Discussed weight management and importance of maintaining a healthy weight. Discussed Vitamin D intake and the importance of adequate vitamin D for both bone health and a healthy immune system.  Discussed daily exercise and the importance of same, in regards to a healthy heart as well as helping to maintain her weight and improving her mental health.  Colonoscopy is up to date. Mammogram is up to date. Bone density is not indicated. Pap smear is not done per ASCCP guidelines. HPV is not done. Lab work up is up to date.      Pt has mammogram scheduled for today.    Pt had noticed some tenderness in right groin and thought maybe her lymph nodes were swollen. I don't notice anything obvious on exam. Pt body habitus does make exam more difficult. There was discharge on exam so did discuss that sometimes vaginal infections can lead to inflamed lymph nodes. Culture collected. Pt will return  if she continues to notices issues in this area.    Pt denies history of abnormal paps. Had hysterectomy but still has cervix and right ovary. LPS last year was normal.    PT complained of menopausal symptoms last year but states that has improved.        Heather Jaimes, APRN  3/21/2024    Return in about 1 year (around 3/20/2025) for Annual physical.

## 2024-03-22 LAB
A VAGINAE DNA VAG QL NAA+PROBE: NORMAL SCORE
BVAB2 DNA VAG QL NAA+PROBE: NORMAL SCORE
C ALBICANS DNA VAG QL NAA+PROBE: NEGATIVE
C GLABRATA DNA VAG QL NAA+PROBE: NEGATIVE
C TRACH DNA VAG QL NAA+PROBE: NEGATIVE
MEGA1 DNA VAG QL NAA+PROBE: NORMAL SCORE
N GONORRHOEA DNA VAG QL NAA+PROBE: NEGATIVE
T VAGINALIS DNA VAG QL NAA+PROBE: NEGATIVE

## 2024-06-04 ENCOUNTER — HOSPITAL ENCOUNTER (OUTPATIENT)
Dept: SLEEP CENTER | Age: 54
Discharge: HOME | End: 2024-06-06
Payer: COMMERCIAL

## 2024-06-04 PROCEDURE — G0399 HOME SLEEP TEST/TYPE 3 PORTA: HCPCS

## 2024-06-04 NOTE — PROGRESS NOTES
Ms.Mechelle Connell presented today in the sleep center for a Home Sleep Test (HST).  Ms. Connell was instructed on the device and was requested to wear the unit for two nights.  was asked to have the HST monitor back by 10AM on  06/06/2024. The patient acknowledged she understood. The HST device was tested and was in working order.

## 2024-06-05 ENCOUNTER — TRANSCRIBE ORDERS (OUTPATIENT)
Dept: ADMINISTRATIVE | Facility: HOSPITAL | Age: 54
End: 2024-06-05
Payer: COMMERCIAL

## 2024-06-05 DIAGNOSIS — M79.675 PAIN IN LEFT TOE(S): Primary | ICD-10-CM

## 2024-06-05 PROCEDURE — G0399 HOME SLEEP TEST/TYPE 3 PORTA: HCPCS

## 2024-06-06 ENCOUNTER — HOSPITAL ENCOUNTER (OUTPATIENT)
Dept: GENERAL RADIOLOGY | Facility: HOSPITAL | Age: 54
Discharge: HOME | End: 2024-06-06
Admitting: NURSE PRACTITIONER
Payer: COMMERCIAL

## 2024-06-06 DIAGNOSIS — M79.675 PAIN IN LEFT TOE(S): ICD-10-CM

## 2024-06-06 PROCEDURE — 73630 X-RAY EXAM OF FOOT: CPT

## 2024-06-14 DIAGNOSIS — G47.33 OSA (OBSTRUCTIVE SLEEP APNEA): Primary | ICD-10-CM

## 2024-06-18 ENCOUNTER — TELEPHONE (OUTPATIENT)
Dept: SLEEP CENTER | Age: 54
End: 2024-06-18

## 2024-06-18 NOTE — TELEPHONE ENCOUNTER
Left voicemail with Ms.Mechelle Connell about her HST performed  06/04/2024 and 06/06/2024.  The HST on 06/04/2024 revealed an AHI of  33.4  and the study on 06/05/2024 revealed an AHI of 36.8.  An AHI between >30 considered to be within the severe range.    The interpreting physician wrote orders for an APAP with a minimum pressure of 8cm/H2O and a maximum pressure of 20cm/H2O.  Orders, documentation and insurance information were sent to [employer].  Results were routed to the ordering provider, DORETHA Meyer.

## 2024-07-23 ENCOUNTER — DOCUMENTATION (OUTPATIENT)
Dept: GENETICS | Facility: HOSPITAL | Age: 54
End: 2024-07-23
Payer: COMMERCIAL

## 2024-07-23 NOTE — CODE DOCUMENTATION
I called the patient to discuss a reclassification from her previous genetic test results.  She voiced understanding.

## 2024-12-14 ENCOUNTER — APPOINTMENT (OUTPATIENT)
Dept: CT IMAGING | Facility: HOSPITAL | Age: 54
End: 2024-12-14
Payer: COMMERCIAL

## 2024-12-14 ENCOUNTER — HOSPITAL ENCOUNTER (EMERGENCY)
Facility: HOSPITAL | Age: 54
Discharge: HOME | End: 2024-12-14
Attending: FAMILY MEDICINE
Payer: COMMERCIAL

## 2024-12-14 VITALS
HEART RATE: 72 BPM | OXYGEN SATURATION: 99 % | DIASTOLIC BLOOD PRESSURE: 59 MMHG | TEMPERATURE: 98.1 F | RESPIRATION RATE: 16 BRPM | BODY MASS INDEX: 48.32 KG/M2 | SYSTOLIC BLOOD PRESSURE: 117 MMHG | WEIGHT: 283 LBS | HEIGHT: 64 IN

## 2024-12-14 DIAGNOSIS — R10.11 RIGHT UPPER QUADRANT ABDOMINAL PAIN: Primary | ICD-10-CM

## 2024-12-14 DIAGNOSIS — R10.9 RIGHT FLANK PAIN: ICD-10-CM

## 2024-12-14 LAB
ALBUMIN SERPL-MCNC: 3.7 G/DL (ref 3.5–5.2)
ALBUMIN/GLOB SERPL: 1.1 G/DL
ALP SERPL-CCNC: 180 U/L (ref 39–117)
ALT SERPL W P-5'-P-CCNC: 19 U/L (ref 1–33)
ANION GAP SERPL CALCULATED.3IONS-SCNC: 12 MMOL/L (ref 5–15)
APTT PPP: 25.5 SECONDS (ref 24.5–36)
AST SERPL-CCNC: 21 U/L (ref 1–32)
BASOPHILS # BLD AUTO: 0.03 10*3/MM3 (ref 0–0.2)
BASOPHILS NFR BLD AUTO: 0.4 % (ref 0–1.5)
BILIRUB SERPL-MCNC: 0.2 MG/DL (ref 0–1.2)
BUN SERPL-MCNC: 13 MG/DL (ref 6–20)
BUN/CREAT SERPL: 18.8 (ref 7–25)
CALCIUM SPEC-SCNC: 8.7 MG/DL (ref 8.6–10.5)
CHLORIDE SERPL-SCNC: 101 MMOL/L (ref 98–107)
CO2 SERPL-SCNC: 24 MMOL/L (ref 22–29)
CREAT SERPL-MCNC: 0.69 MG/DL (ref 0.57–1)
D-LACTATE SERPL-SCNC: 2 MMOL/L (ref 0.5–2)
DEPRECATED RDW RBC AUTO: 39.6 FL (ref 37–54)
EGFRCR SERPLBLD CKD-EPI 2021: 104.6 ML/MIN/1.73
EOSINOPHIL # BLD AUTO: 0.1 10*3/MM3 (ref 0–0.4)
EOSINOPHIL NFR BLD AUTO: 1.2 % (ref 0.3–6.2)
ERYTHROCYTE [DISTWIDTH] IN BLOOD BY AUTOMATED COUNT: 13.9 % (ref 12.3–15.4)
GLOBULIN UR ELPH-MCNC: 3.4 GM/DL
GLUCOSE SERPL-MCNC: 97 MG/DL (ref 65–99)
HCT VFR BLD AUTO: 40.3 % (ref 34–46.6)
HGB BLD-MCNC: 13.4 G/DL (ref 12–15.9)
IMM GRANULOCYTES # BLD AUTO: 0.03 10*3/MM3 (ref 0–0.05)
IMM GRANULOCYTES NFR BLD AUTO: 0.4 % (ref 0–0.5)
INR PPP: 0.99 (ref 0.91–1.09)
LYMPHOCYTES # BLD AUTO: 1.82 10*3/MM3 (ref 0.7–3.1)
LYMPHOCYTES NFR BLD AUTO: 22.7 % (ref 19.6–45.3)
MAGNESIUM SERPL-MCNC: 2.1 MG/DL (ref 1.6–2.6)
MCH RBC QN AUTO: 26.1 PG (ref 26.6–33)
MCHC RBC AUTO-ENTMCNC: 33.3 G/DL (ref 31.5–35.7)
MCV RBC AUTO: 78.6 FL (ref 79–97)
MONOCYTES # BLD AUTO: 0.52 10*3/MM3 (ref 0.1–0.9)
MONOCYTES NFR BLD AUTO: 6.5 % (ref 5–12)
NEUTROPHILS NFR BLD AUTO: 5.52 10*3/MM3 (ref 1.7–7)
NEUTROPHILS NFR BLD AUTO: 68.8 % (ref 42.7–76)
NRBC BLD AUTO-RTO: 0 /100 WBC (ref 0–0.2)
PLATELET # BLD AUTO: 244 10*3/MM3 (ref 140–450)
PMV BLD AUTO: 10.8 FL (ref 6–12)
POTASSIUM SERPL-SCNC: 4.3 MMOL/L (ref 3.5–5.2)
PROT SERPL-MCNC: 7.1 G/DL (ref 6–8.5)
PROTHROMBIN TIME: 13.5 SECONDS (ref 11.8–14.8)
QT INTERVAL: 402 MS
QTC INTERVAL: 442 MS
RBC # BLD AUTO: 5.13 10*6/MM3 (ref 3.77–5.28)
SODIUM SERPL-SCNC: 137 MMOL/L (ref 136–145)
WBC NRBC COR # BLD AUTO: 8.02 10*3/MM3 (ref 3.4–10.8)

## 2024-12-14 PROCEDURE — 85610 PROTHROMBIN TIME: CPT | Performed by: FAMILY MEDICINE

## 2024-12-14 PROCEDURE — 96374 THER/PROPH/DIAG INJ IV PUSH: CPT

## 2024-12-14 PROCEDURE — 83735 ASSAY OF MAGNESIUM: CPT | Performed by: FAMILY MEDICINE

## 2024-12-14 PROCEDURE — 85730 THROMBOPLASTIN TIME PARTIAL: CPT | Performed by: FAMILY MEDICINE

## 2024-12-14 PROCEDURE — 25010000002 ONDANSETRON PER 1 MG: Performed by: FAMILY MEDICINE

## 2024-12-14 PROCEDURE — 83605 ASSAY OF LACTIC ACID: CPT | Performed by: FAMILY MEDICINE

## 2024-12-14 PROCEDURE — 99284 EMERGENCY DEPT VISIT MOD MDM: CPT

## 2024-12-14 PROCEDURE — 25010000002 KETOROLAC TROMETHAMINE PER 15 MG: Performed by: FAMILY MEDICINE

## 2024-12-14 PROCEDURE — 74176 CT ABD & PELVIS W/O CONTRAST: CPT

## 2024-12-14 PROCEDURE — 85025 COMPLETE CBC W/AUTO DIFF WBC: CPT | Performed by: FAMILY MEDICINE

## 2024-12-14 PROCEDURE — 80053 COMPREHEN METABOLIC PANEL: CPT | Performed by: FAMILY MEDICINE

## 2024-12-14 PROCEDURE — 93005 ELECTROCARDIOGRAM TRACING: CPT | Performed by: FAMILY MEDICINE

## 2024-12-14 PROCEDURE — 96375 TX/PRO/DX INJ NEW DRUG ADDON: CPT

## 2024-12-14 RX ORDER — DICYCLOMINE HCL 20 MG
20 TABLET ORAL EVERY 6 HOURS PRN
Qty: 20 TABLET | Refills: 0 | Status: SHIPPED | OUTPATIENT
Start: 2024-12-14 | End: 2024-12-19

## 2024-12-14 RX ORDER — ONDANSETRON 2 MG/ML
4 INJECTION INTRAMUSCULAR; INTRAVENOUS ONCE
Status: COMPLETED | OUTPATIENT
Start: 2024-12-14 | End: 2024-12-14

## 2024-12-14 RX ORDER — KETOROLAC TROMETHAMINE 30 MG/ML
30 INJECTION, SOLUTION INTRAMUSCULAR; INTRAVENOUS ONCE
Status: COMPLETED | OUTPATIENT
Start: 2024-12-14 | End: 2024-12-14

## 2024-12-14 RX ORDER — FAMOTIDINE 10 MG/ML
20 INJECTION, SOLUTION INTRAVENOUS ONCE
Status: COMPLETED | OUTPATIENT
Start: 2024-12-14 | End: 2024-12-14

## 2024-12-14 RX ADMIN — KETOROLAC TROMETHAMINE 30 MG: 30 INJECTION, SOLUTION INTRAMUSCULAR; INTRAVENOUS at 17:01

## 2024-12-14 RX ADMIN — ONDANSETRON 4 MG: 2 INJECTION INTRAMUSCULAR; INTRAVENOUS at 15:23

## 2024-12-14 RX ADMIN — FAMOTIDINE 20 MG: 10 INJECTION INTRAVENOUS at 15:26

## 2024-12-14 NOTE — ED PROVIDER NOTES
HPI:       54-year-old obese female presents to the emergency room with right upper quadrant pain radiating to her back and right shoulder.  Started about a week ago and progressively got worse eating makes it worse and states that she has a decreased appetite.  Has been no trauma.  There is no fever chills or night sweats.  Rates the pain 8 out of 10.  No vaginal bleeding and no rectal bleeding no vomiting of blood.  No headache or visual changes.  Pain shortness of breath or diaphoresis.  No hematuria.    REVIEW OF SYSTEMS  CONSTITUTIONAL:  No complaints of fever, chills,or weakness  EYES:  No complaints of discharge   ENT: No complaints of sore throat or ear pain  CARDIOVASCULAR:  No complaints of chest pain, palpitations, or swelling  RESPIRATORY:  No complaints of cough or shortness of breath  GI: Positive for right upper quadrant abdominal pain   MUSCULOSKELETAL:  No complaints of back pain  SKIN:  No complaints of rash  NEUROLOGIC:  No complaints of headache, focal weakness, or sensory changes  ENDOCRINE:  No complaints of polyuria or polydipsia  LYMPHATIC:  No complaints of swollen glands  GENITOURINARY: Positive for right flank pain and right upper quadrant abdominal pain with urinary frequency  PAST MEDICAL HISTORY  Past Medical History:   Diagnosis Date    Anxiety     Diabetes mellitus     Endometriosis 1987    Female infertility     Hypertension     Migraine     Ovarian cyst 1987    Polycystic ovary syndrome     Rh incompatibility        FAMILY HISTORY  Family History   Problem Relation Age of Onset    Breast cancer Mother     Cancer Mother     Diabetes Mother     Breast cancer Maternal Aunt     Breast cancer Maternal Aunt     Pancreatic cancer Maternal Uncle     Colon cancer Maternal Uncle         Agent Orange related    Diabetes Maternal Uncle     Colon cancer Maternal Uncle     Prostate cancer Maternal Uncle     Ovarian cancer Neg Hx        SOCIAL HISTORY  Social History     Socioeconomic History     Marital status:    Tobacco Use    Smoking status: Former     Current packs/day: 0.00     Average packs/day: 0.5 packs/day for 20.0 years (10.0 ttl pk-yrs)     Types: Cigarettes     Start date: 4/1/1989     Quit date: 4/1/2009     Years since quitting: 15.7    Smokeless tobacco: Never   Vaping Use    Vaping status: Never Used   Substance and Sexual Activity    Alcohol use: No    Drug use: No    Sexual activity: Not Currently     Partners: Male     Birth control/protection: Hysterectomy       IMMUNIZATION HISTORY  Deferred to primary care physician.    SURGICAL HISTORY  Past Surgical History:   Procedure Laterality Date    D & C WITH SUCTION  1999    HYSTERECTOMY SUPRACERVICAL      due to endometriosis and bleeding.     LAPAROSCOPIC SALPINGOOPHERECTOMY Left     still has right ovary    OVARIAN CYST SURGERY  1993    right ovary    TOTAL ABDOMINAL HYSTERECTOMY  2007       CURRENT MEDICATIONS    Current Facility-Administered Medications:     ketorolac (TORADOL) injection 30 mg, 30 mg, Intravenous, Once, Petey Hernandez Jr., MD    Current Outpatient Medications:     amLODIPine (NORVASC) 5 MG tablet, Take 1 tablet by mouth Daily., Disp: , Rfl:     cetirizine (zyrTEC) 10 MG tablet, Take 1 tablet by mouth Daily., Disp: , Rfl:     Continuous Blood Gluc Sensor (FreeStyle Rebekah 3 Sensor) misc, See Admin Instructions., Disp: , Rfl:     dicyclomine (BENTYL) 20 MG tablet, Take 1 tablet by mouth Every 6 (Six) Hours As Needed for Abdominal Cramping for up to 5 days., Disp: 20 tablet, Rfl: 0    FLUoxetine (PROzac) 40 MG capsule, , Disp: , Rfl:     losartan (COZAAR) 50 MG tablet, TAKE 1 TABLET BY MOUTH EVERY DAY FOR 90 DAYS, Disp: , Rfl:     meloxicam (MOBIC) 15 MG tablet, Take 1 tablet by mouth Daily., Disp: , Rfl:     omeprazole (priLOSEC) 20 MG capsule, TAKE 1 CAPSULE BY MOUTH EVERY DAY 30 MINUTES BEFORE MORNING MEAL FOR 90 DAYS, Disp: , Rfl:     Ozempic, 0.25 or 0.5 MG/DOSE, 2 MG/3ML solution pen-injector, INJECT 0.25  "MG UNDER THE SKIN ONCE A WEEK FOR 4 WEEKS THEN 0.5 MG WEEKLY, Disp: , Rfl:     ALLERGIES  Allergies   Allergen Reactions    Shellfish-Derived Products Anaphylaxis    Codeine Unknown - Low Severity     Heart slows down    Penicillins Hives    Lisinopril Cough       ABDOMINAL EXAM    VITAL SIGNS:   /76 (BP Location: Right arm, Patient Position: Sitting)   Pulse 74   Temp 98.6 °F (37 °C) (Oral)   Resp 16   Ht 163.2 cm (64.25\")   Wt 128 kg (283 lb)   SpO2 99%   BMI 48.20 kg/m²     Constitutional: Patient is alert and in no distress.  Patient with moderate right upper quadrant abdominal pain and right flank pain and discomfort.    ENT: There is a normal pharynx with no acute erythema or exudate and oral mucosa is moist.  Nose is clear with no drainage.  Tympanic membranes intact and non-erythemic    Cardiovascular: S1-S2 regular rate and rhythm. No murmurs, rubs or gallops.  Pulses are equal bilaterally and there is no pitting edema.    Respiratory: Patient is clear to auscultation bilaterally with no wheezing or rhonchi.  Chest wall is nontender.  There are no external lesions on the chest.  There is no crepitance.    Gastrointestinal: Obese abdomen.  Bowel sounds normal in all 4 quadrants.  There is no abdominal distention.  There is no obvious fluid wave.  Positive Dodson sign.  Negative tenderness at McBurney's.  Negative psoas sign.  Negative obturator.    Genitourinary: No costovertebral angle tenderness to percussion.  There is mild suprapubic tenderness to palpation.  Integument: No acute lesions noted.  Color appears to be normal.    Jeremiah Coma Scale: Total score 15    Neurological: Patient is alert and oriented x4 and no acute findings noted.  Speech is fluent and cognition is normal.  No evidence of acute CVA.  Cranial nerves II through XII intact.  Patient with normal motor function as well as reflexes and sensation    Psychiatric: Normal affect and " mood.            RADIOLOGY/PROCEDURES        CT Abdomen Pelvis Without Contrast   Final Result       No CT explanation for right upper quadrant pain.       Mildly prominent portacaval lymph node, favored reactive.               This report was signed and finalized on 12/14/2024 3:57 PM by Boston Chao.                 FUTURE APPOINTMENTS     Future Appointments   Date Time Provider Department Center   3/21/2025  9:00 AM Yahaira Forman, [facility]           COURSE & MEDICAL DECISION MAKING       Patient's partial differential diagnosis can include:    Cholecystitis, acute appendicitis, gastritis, gastroenteritis, colitis, enteritis, volvulus, intussusception, esophageal spasms, gastroparesis, GERD, peptic ulcer disease, perforated esophagus, perforated peptic ulcer, partial bowel obstruction, bowel obstruction,, AAA, mesenteric adenitis, mesenteric ischemia, constipation, irritable bowel, diverticulitis, diverticulosis, Crohn's disease, ulcerative colitis, celiac disease and others    All labs and radiological test explained to the patient.  There is no acute findings that need surgical or intervention or admission.  Patient feels comfortable with plan of the Bentyl as well as outpatient follow-up gastroenterologist if needed.  Patient advised to return to the emergency room if needed.    Patient's level of risk: Low       CRITICAL CARE    CRITICAL CARE: No    CRITICAL CARE TIME: None      Recent Results (from the past 24 hours)   Comprehensive Metabolic Panel    Collection Time: 12/14/24  3:20 PM    Specimen: Blood   Result Value Ref Range    Glucose 97 65 - 99 mg/dL    BUN 13 6 - 20 mg/dL    Creatinine 0.69 0.57 - 1.00 mg/dL    Sodium 137 136 - 145 mmol/L    Potassium 4.3 3.5 - 5.2 mmol/L    Chloride 101 98 - 107 mmol/L    CO2 24.0 22.0 - 29.0 mmol/L    Calcium 8.7 8.6 - 10.5 mg/dL    Total Protein 7.1 6.0 - 8.5 g/dL    Albumin 3.7 3.5 - 5.2 g/dL    ALT (SGPT) 19 1 - 33 U/L    AST (SGOT) 21 1 -  32 U/L    Alkaline Phosphatase 180 (H) 39 - 117 U/L    Total Bilirubin 0.2 0.0 - 1.2 mg/dL    Globulin 3.4 gm/dL    A/G Ratio 1.1 g/dL    BUN/Creatinine Ratio 18.8 7.0 - 25.0    Anion Gap 12.0 5.0 - 15.0 mmol/L    eGFR 104.6 >60.0 mL/min/1.73   Protime-INR    Collection Time: 12/14/24  3:20 PM    Specimen: Blood   Result Value Ref Range    Protime 13.5 11.8 - 14.8 Seconds    INR 0.99 0.91 - 1.09   aPTT    Collection Time: 12/14/24  3:20 PM    Specimen: Blood   Result Value Ref Range    PTT 25.5 24.5 - 36.0 seconds   Lactic Acid, Plasma    Collection Time: 12/14/24  3:20 PM    Specimen: Blood   Result Value Ref Range    Lactate 2.0 0.5 - 2.0 mmol/L   Magnesium    Collection Time: 12/14/24  3:20 PM    Specimen: Blood   Result Value Ref Range    Magnesium 2.1 1.6 - 2.6 mg/dL   CBC Auto Differential    Collection Time: 12/14/24  3:20 PM    Specimen: Blood   Result Value Ref Range    WBC 8.02 3.40 - 10.80 10*3/mm3    RBC 5.13 3.77 - 5.28 10*6/mm3    Hemoglobin 13.4 12.0 - 15.9 g/dL    Hematocrit 40.3 34.0 - 46.6 %    MCV 78.6 (L) 79.0 - 97.0 fL    MCH 26.1 (L) 26.6 - 33.0 pg    MCHC 33.3 31.5 - 35.7 g/dL    RDW 13.9 12.3 - 15.4 %    RDW-SD 39.6 37.0 - 54.0 fl    MPV 10.8 6.0 - 12.0 fL    Platelets 244 140 - 450 10*3/mm3    Neutrophil % 68.8 42.7 - 76.0 %    Lymphocyte % 22.7 19.6 - 45.3 %    Monocyte % 6.5 5.0 - 12.0 %    Eosinophil % 1.2 0.3 - 6.2 %    Basophil % 0.4 0.0 - 1.5 %    Immature Grans % 0.4 0.0 - 0.5 %    Neutrophils, Absolute 5.52 1.70 - 7.00 10*3/mm3    Lymphocytes, Absolute 1.82 0.70 - 3.10 10*3/mm3    Monocytes, Absolute 0.52 0.10 - 0.90 10*3/mm3    Eosinophils, Absolute 0.10 0.00 - 0.40 10*3/mm3    Basophils, Absolute 0.03 0.00 - 0.20 10*3/mm3    Immature Grans, Absolute 0.03 0.00 - 0.05 10*3/mm3    nRBC 0.0 0.0 - 0.2 /100 WBC   ECG 12 Lead Other; Epigastric pain    Collection Time: 12/14/24  3:58 PM   Result Value Ref Range    QT Interval 402 ms    QTC Interval 442 ms          Old charts were reviewed  per [employer] EMR.  Pertinent details are summarized above.  All laboratory, radiologic, and EKG studies that were performed in the Emergency Department were a necessary part of the evaluation needed to exclude unstable or  emergent medical conditions.     Patient was hemodynamically and neurologically stable in the ED.   Pertinent studies were reviewed as above.     The patient received:  Medications   ketorolac (TORADOL) injection 30 mg (has no administration in time range)   ondansetron (ZOFRAN) injection 4 mg (4 mg Intravenous Given 12/14/24 1523)   famotidine (PEPCID) injection 20 mg (20 mg Intravenous Given 12/14/24 1526)            Diagnoses that have been ruled out:   None   Diagnoses that are still under consideration:   None   Final diagnoses:   Right upper quadrant abdominal pain   Right flank pain        FINAL IMPRESSION   Diagnosis Plan   1. Right upper quadrant abdominal pain        2. Right flank pain              Petey Hernandez Jr, MD        ED Disposition       ED Disposition   Discharge    Condition   Stable    Comment   --                 Dragon disclaimer:  Part of this note may be an electronic transcription/translation of spoken language to printed text using the Dragon Dictation System.     I have reviewed the patient’s prescription history via a prescription monitoring program.  This information is consistent with my knowledge of the patient’s controlled substance use history.        Petey Hernandez Jr., MD  12/14/24 1623

## 2024-12-16 ENCOUNTER — TRANSCRIBE ORDERS (OUTPATIENT)
Dept: ADMINISTRATIVE | Facility: HOSPITAL | Age: 54
End: 2024-12-16
Payer: COMMERCIAL

## 2024-12-17 ENCOUNTER — TRANSCRIBE ORDERS (OUTPATIENT)
Dept: ADMINISTRATIVE | Facility: HOSPITAL | Age: 54
End: 2024-12-17
Payer: COMMERCIAL

## 2024-12-17 DIAGNOSIS — R10.11 RIGHT UPPER QUADRANT PAIN: Primary | ICD-10-CM

## 2024-12-17 LAB
QT INTERVAL: 402 MS
QTC INTERVAL: 442 MS

## 2025-01-31 ENCOUNTER — TRANSCRIBE ORDERS (OUTPATIENT)
Dept: ADMINISTRATIVE | Facility: HOSPITAL | Age: 55
End: 2025-01-31
Payer: COMMERCIAL

## 2025-01-31 DIAGNOSIS — Z78.0 ASYMPTOMATIC MENOPAUSAL STATE: Primary | ICD-10-CM

## 2025-02-05 ENCOUNTER — TRANSCRIBE ORDERS (OUTPATIENT)
Dept: ADMINISTRATIVE | Facility: HOSPITAL | Age: 55
End: 2025-02-05
Payer: COMMERCIAL

## 2025-02-05 DIAGNOSIS — Z12.31 OTHER SCREENING MAMMOGRAM: Primary | ICD-10-CM

## 2025-03-21 ENCOUNTER — OFFICE VISIT (OUTPATIENT)
Dept: OBSTETRICS AND GYNECOLOGY | Age: 55
End: 2025-03-21
Payer: COMMERCIAL

## 2025-03-21 VITALS
BODY MASS INDEX: 48.49 KG/M2 | SYSTOLIC BLOOD PRESSURE: 126 MMHG | HEIGHT: 64 IN | WEIGHT: 284 LBS | DIASTOLIC BLOOD PRESSURE: 84 MMHG

## 2025-03-21 DIAGNOSIS — Z01.419 WELL WOMAN EXAM WITH ROUTINE GYNECOLOGICAL EXAM: Primary | ICD-10-CM

## 2025-03-21 RX ORDER — FLUTICASONE PROPIONATE 50 MCG
SPRAY, SUSPENSION (ML) NASAL
COMMUNITY
Start: 2025-02-03

## 2025-03-21 RX ORDER — ONDANSETRON 8 MG/1
TABLET, ORALLY DISINTEGRATING ORAL
COMMUNITY

## 2025-03-21 RX ORDER — LOSARTAN POTASSIUM 50 MG/1
1 TABLET ORAL DAILY
COMMUNITY

## 2025-03-21 RX ORDER — CYCLOBENZAPRINE HCL 5 MG
TABLET ORAL
COMMUNITY

## 2025-03-21 RX ORDER — RIZATRIPTAN BENZOATE 10 MG/1
TABLET ORAL
COMMUNITY

## 2025-03-21 RX ORDER — OFLOXACIN 3 MG/ML
SOLUTION AURICULAR (OTIC)
COMMUNITY
Start: 2025-02-05

## 2025-03-21 RX ORDER — BUTALBITAL, ACETAMINOPHEN AND CAFFEINE 300; 40; 50 MG/1; MG/1; MG/1
CAPSULE ORAL
COMMUNITY

## 2025-03-21 RX ORDER — ATORVASTATIN CALCIUM 40 MG/1
40 TABLET, FILM COATED ORAL DAILY
COMMUNITY

## 2025-03-21 NOTE — PROGRESS NOTES
"Subjective     Radha Willis is a 55 y.o. female    History of Present Illness  The patient presents to [facility] office today for completion of her WWE. She denies having any concerns regarding vaginal discharge, odor, pelvic pains, or urinary symptoms that she would like to discuss today.  Gynecologic Exam  The patient's pertinent negatives include no genital itching, genital lesions, genital odor, genital rash, missed menses, pelvic pain, vaginal bleeding or vaginal discharge. The patient is experiencing no pain. Pertinent negatives include no abdominal pain, anorexia, back pain, chills, constipation, diarrhea, discolored urine, dysuria, fever, flank pain, frequency, headaches, hematuria, joint pain, joint swelling, nausea, painful intercourse, rash, sore throat, urgency or vomiting. She is sexually active. No, her partner does not have an STD. She uses hysterectomy for contraception. The maximum temperature recorded prior to her arrival was no fever.         /84   Ht 163.2 cm (64.25\")   Wt 129 kg (284 lb)   BMI 48.37 kg/m²     Outpatient Encounter Medications as of 3/21/2025   Medication Sig Dispense Refill    amLODIPine (NORVASC) 5 MG tablet Take 1 tablet by mouth Daily.      atorvastatin (LIPITOR) 40 MG tablet Take 1 tablet by mouth Daily.      butalbital-acetaminophen-caffeine (ORBIVAN) -40 MG capsule capsule 1-2 tab q4hr prn migraine, max 6/24 hrs, do not take w tylenol      cetirizine (zyrTEC) 10 MG tablet Take 1 tablet by mouth Daily.      Cholecalciferol 50 MCG (2000 UT) capsule Take 1 capsule by mouth Daily.      Continuous Blood Gluc Sensor (FreeStyle Rebekah 3 Sensor) misc See Admin Instructions.      cyclobenzaprine (FLEXERIL) 5 MG tablet Take 1 tablet as needed by oral route at bedtime.      FLUoxetine (PROzac) 40 MG capsule       fluticasone (FLONASE) 50 MCG/ACT nasal spray SPRAY 1 SPRAY IN EACH NOSTRIL EVERY DAY      losartan (COZAAR) 50 MG tablet Take 1 tablet by mouth Daily.      " meloxicam (MOBIC) 15 MG tablet Take 1 tablet by mouth Daily.      ofloxacin (FLOXIN) 0.3 % otic solution INSTILL 10 DROPS INTO AFFECTED EAR(S) BY OTIC ROUTE ONCE DAILY X 7 DAYS      omeprazole (priLOSEC) 20 MG capsule TAKE 1 CAPSULE BY MOUTH EVERY DAY 30 MINUTES BEFORE MORNING MEAL FOR 90 DAYS      ondansetron ODT (ZOFRAN-ODT) 8 MG disintegrating tablet Place 1 tablet twice a day by translingual route as needed for 30 days.      Ozempic, 0.25 or 0.5 MG/DOSE, 2 MG/3ML solution pen-injector INJECT 0.25 MG UNDER THE SKIN ONCE A WEEK FOR 4 WEEKS THEN 0.5 MG WEEKLY      rimegepant sulfate ODT (Nurtec) 75 MG disintegrating tablet Take by oral route for 30 days.      rizatriptan (MAXALT) 10 MG tablet TAKE 1 TABLET BY MOUTH EVERY 2 HOURS AS NEEDED FOR MIGRAINE. MAX DOSE 3/24 HOURS      [DISCONTINUED] losartan (COZAAR) 50 MG tablet TAKE 1 TABLET BY MOUTH EVERY DAY FOR 90 DAYS       No facility-administered encounter medications on file as of 3/21/2025.       Past Medical History  Past Medical History:   Diagnosis Date    Anxiety     Diabetes mellitus     Endometriosis 1987    Female infertility     Hypertension     Migraine     Ovarian cyst 1987    Polycystic ovary syndrome     Rh incompatibility        Surgical History  Past Surgical History:   Procedure Laterality Date    D & C WITH SUCTION  1999    HYSTERECTOMY SUPRACERVICAL      due to endometriosis and bleeding.     LAPAROSCOPIC SALPINGOOPHERECTOMY Left     still has right ovary    OVARIAN CYST SURGERY  1993    right ovary    TOTAL ABDOMINAL HYSTERECTOMY  2007       Family History  Family History   Problem Relation Age of Onset    Breast cancer Mother     Cancer Mother     Diabetes Mother     Breast cancer Maternal Aunt     Breast cancer Maternal Aunt     Pancreatic cancer Maternal Uncle     Colon cancer Maternal Uncle         Agent Orange related    Diabetes Maternal Uncle     Colon cancer Maternal Uncle     Prostate cancer Maternal Uncle     Ovarian cancer Neg Hx         The following portions of the patient's history were reviewed and updated as appropriate: allergies, current medications, past family history, past medical history, past social history, past surgical history, and problem list.    Review of Systems   Constitutional: Negative.  Negative for chills and fever.   HENT: Negative.  Negative for sore throat.    Eyes: Negative.    Respiratory: Negative.     Cardiovascular: Negative.    Gastrointestinal: Negative.  Negative for abdominal pain, anorexia, constipation, diarrhea, nausea and vomiting.   Endocrine: Negative.    Genitourinary: Negative.  Negative for breast discharge, breast lump, breast pain, dysuria, flank pain, frequency, hematuria, missed menses, pelvic pain, urgency and vaginal discharge.   Musculoskeletal: Negative.  Negative for back pain and joint pain.   Skin: Negative.  Negative for rash.   Allergic/Immunologic: Negative.    Neurological: Negative.    Hematological: Negative.    Psychiatric/Behavioral: Negative.         Objective   Physical Exam  Vitals and nursing note reviewed. Exam conducted with a chaperone present (Iliana Kothari MA).   Constitutional:       General: She is not in acute distress.     Appearance: She is well-developed. She is obese. She is not diaphoretic.   HENT:      Head: Normocephalic.      Right Ear: External ear normal.      Left Ear: External ear normal.      Nose: Nose normal.   Eyes:      General: No scleral icterus.        Right eye: No discharge.         Left eye: No discharge.      Conjunctiva/sclera: Conjunctivae normal.      Pupils: Pupils are equal, round, and reactive to light.   Neck:      Thyroid: No thyromegaly.      Vascular: No carotid bruit.      Trachea: No tracheal deviation.   Cardiovascular:      Rate and Rhythm: Normal rate and regular rhythm.      Pulses: Normal pulses.      Heart sounds: Normal heart sounds. No murmur heard.  Pulmonary:      Effort: Pulmonary effort is normal. No respiratory distress.       Breath sounds: Normal breath sounds. No wheezing.   Chest:   Breasts:     Breasts are symmetrical.      Right: Normal. No swelling, bleeding, inverted nipple, mass, nipple discharge, skin change or tenderness.      Left: Normal. No swelling, bleeding, inverted nipple, mass, nipple discharge, skin change or tenderness.   Abdominal:      General: Bowel sounds are normal. There is no distension.      Palpations: Abdomen is soft. There is no mass.      Tenderness: There is no abdominal tenderness. There is no right CVA tenderness, left CVA tenderness or guarding.      Hernia: No hernia is present. There is no hernia in the left inguinal area or right inguinal area.   Genitourinary:     General: Normal vulva.      Exam position: Lithotomy position.      Labia:         Right: No rash, tenderness, lesion or injury.         Left: No rash, tenderness, lesion or injury.       Vagina: Normal. No signs of injury and foreign body. No vaginal discharge, erythema, tenderness or bleeding.      Cervix: Normal.      Uterus: Absent.       Rectum: Normal. No mass.      Comments:   BSU normal  Urethral meatus  Normal  Perineum  Normal  Uterus and one adnexa surgically absent.    Musculoskeletal:         General: No tenderness. Normal range of motion.      Cervical back: Normal range of motion and neck supple.   Lymphadenopathy:      Head:      Right side of head: No submental, submandibular, tonsillar, preauricular, posterior auricular or occipital adenopathy.      Left side of head: No submental, submandibular, tonsillar, preauricular, posterior auricular or occipital adenopathy.      Cervical: No cervical adenopathy.      Right cervical: No superficial, deep or posterior cervical adenopathy.     Left cervical: No superficial, deep or posterior cervical adenopathy.      Upper Body:      Right upper body: No supraclavicular, axillary or pectoral adenopathy.      Left upper body: No supraclavicular, axillary or pectoral adenopathy.       Lower Body: No right inguinal adenopathy. No left inguinal adenopathy.   Skin:     General: Skin is warm and dry.      Findings: No bruising, erythema or rash.   Neurological:      Mental Status: She is alert and oriented to person, place, and time.      Coordination: Coordination normal.   Psychiatric:         Mood and Affect: Mood normal.         Behavior: Behavior normal.         Thought Content: Thought content normal.         Judgment: Judgment normal.         PHQ-9 Depression Screening  Little interest or pleasure in doing things? Not at all   Feeling down, depressed, or hopeless? Not at all   PHQ-2 Total Score 0   Trouble falling or staying asleep, or sleeping too much?     Feeling tired or having little energy?     Poor appetite or overeating?     Feeling bad about yourself - or that you are a failure or have let yourself or your family down?     Trouble concentrating on things, such as reading the newspaper or watching television?     Moving or speaking so slowly that other people could have noticed? Or the opposite - being so fidgety or restless that you have been moving around a lot more than usual?     Thoughts that you would be better off dead, or of hurting yourself in some way?     PHQ-9 Total Score     If you checked off any problems, how difficult have these problems made it for you to do your work, take care of things at home, or get along with other people?          Assessment & Plan   Diagnoses and all orders for this visit:    1. Well woman exam with routine gynecological exam (Primary)  Comments:  The patient presents to the office today for completion of her WWE. She is established with her pcp and screening labs are current. Her last pelvic exam with pap smear was completed on 3/15/23, normal results. She did have a hysterectomy but partial cervix remains. She does have a history of endometriosis. Per ASCCP guidelines screening pap smear not performed today, only pelvic exam. Mammogram  has been ordered by her pcp and is scheduled for completion next week. Her last colonoscopy was performed on 5/19/22 with 5 year recommendation to return. She did not voice any GYN concerns to discuss today. Normal GYN exam.         Normal GYN exam. Encouraged SBE, pt is aware how to do self breast exam and the importance of same. Discussed weight management and importance of maintaining a healthy weight. Discussed Vitamin D intake and the importance of adequate vitamin D for both bone health and a healthy immune system.  Discussed daily exercise and the importance of same, in regards to a healthy heart as well as helping to maintain her weight and improving her mental health.  Colonoscopy is up to date. Mammogram  pending . Bone density is not indicated. Pap smear is not done per ASCCP guidelines. HPV is done. Lab work up is up to date.      Class 3 Severe Obesity (BMI >=40). Obesity-related health conditions include the following: hypertension. Obesity is unchanged. BMI is is above average; BMI management plan is completed. We discussed portion control, increasing exercise, and Information on healthy weight added to patient's after visit summary.      Marina Fang, APRN  3/21/2025

## 2025-03-31 ENCOUNTER — HOSPITAL ENCOUNTER (OUTPATIENT)
Dept: BONE DENSITY | Facility: HOSPITAL | Age: 55
Discharge: HOME | End: 2025-03-31
Payer: COMMERCIAL

## 2025-03-31 ENCOUNTER — HOSPITAL ENCOUNTER (OUTPATIENT)
Dept: MAMMOGRAPHY | Facility: HOSPITAL | Age: 55
Discharge: HOME | End: 2025-03-31
Payer: COMMERCIAL

## 2025-03-31 DIAGNOSIS — Z78.0 ASYMPTOMATIC MENOPAUSAL STATE: ICD-10-CM

## 2025-03-31 DIAGNOSIS — Z12.31 OTHER SCREENING MAMMOGRAM: ICD-10-CM

## 2025-03-31 PROCEDURE — 77063 BREAST TOMOSYNTHESIS BI: CPT

## 2025-03-31 PROCEDURE — 77067 SCR MAMMO BI INCL CAD: CPT

## 2025-03-31 PROCEDURE — 77080 DXA BONE DENSITY AXIAL: CPT

## 2025-06-03 ENCOUNTER — OFFICE VISIT (OUTPATIENT)
Dept: PULMONOLOGY | Age: 55
End: 2025-06-03
Payer: COMMERCIAL

## 2025-06-03 VITALS
TEMPERATURE: 98.4 F | WEIGHT: 281.8 LBS | HEART RATE: 86 BPM | DIASTOLIC BLOOD PRESSURE: 75 MMHG | SYSTOLIC BLOOD PRESSURE: 116 MMHG | BODY MASS INDEX: 48.11 KG/M2 | OXYGEN SATURATION: 96 % | HEIGHT: 64 IN

## 2025-06-03 DIAGNOSIS — G47.33 SEVERE OBSTRUCTIVE SLEEP APNEA: Primary | ICD-10-CM

## 2025-06-03 DIAGNOSIS — E66.01 MORBID OBESITY (HCC): ICD-10-CM

## 2025-06-03 DIAGNOSIS — G47.8 NON-RESTORATIVE SLEEP: ICD-10-CM

## 2025-06-03 DIAGNOSIS — G47.10 HYPERSOMNIA: ICD-10-CM

## 2025-06-03 PROCEDURE — 99203 OFFICE O/P NEW LOW 30 MIN: CPT | Performed by: NURSE PRACTITIONER

## 2025-06-03 RX ORDER — ATORVASTATIN CALCIUM 40 MG/1
40 TABLET, FILM COATED ORAL DAILY
COMMUNITY

## 2025-06-03 RX ORDER — MELOXICAM 15 MG/1
15 TABLET ORAL DAILY
COMMUNITY
Start: 2025-04-29

## 2025-06-03 RX ORDER — SEMAGLUTIDE 0.68 MG/ML
INJECTION, SOLUTION SUBCUTANEOUS
COMMUNITY
Start: 2025-05-28

## 2025-06-03 RX ORDER — RIZATRIPTAN BENZOATE 10 MG/1
TABLET ORAL
COMMUNITY

## 2025-06-03 RX ORDER — FLUOXETINE HYDROCHLORIDE 40 MG/1
CAPSULE ORAL DAILY
COMMUNITY
Start: 2025-04-29

## 2025-06-03 RX ORDER — CETIRIZINE HYDROCHLORIDE 10 MG/1
10 TABLET ORAL DAILY
COMMUNITY

## 2025-06-03 RX ORDER — AMLODIPINE BESYLATE 5 MG/1
5 TABLET ORAL DAILY
COMMUNITY
Start: 2025-04-22

## 2025-06-03 RX ORDER — HYDROCHLOROTHIAZIDE 12.5 MG/1
CAPSULE ORAL
COMMUNITY
Start: 2025-06-02

## 2025-06-03 RX ORDER — OXYCODONE AND ACETAMINOPHEN 5; 325 MG/1; MG/1
1 TABLET ORAL PRN
COMMUNITY

## 2025-06-03 RX ORDER — OMEPRAZOLE 20 MG/1
CAPSULE, DELAYED RELEASE ORAL
COMMUNITY
Start: 2025-04-22

## 2025-06-03 RX ORDER — LOSARTAN POTASSIUM 50 MG/1
50 TABLET ORAL DAILY
COMMUNITY

## 2025-06-03 ASSESSMENT — ENCOUNTER SYMPTOMS
GASTROINTESTINAL NEGATIVE: 1
EYES NEGATIVE: 1
APNEA: 1
ALLERGIC/IMMUNOLOGIC NEGATIVE: 1

## 2025-06-03 NOTE — PROGRESS NOTES
Pulmonary and Sleep Medicine    Jacqueline CHADWICK (:  1970) is a 55 y.o. female,New patient, here for evaluation of the following chief complaint(s):  New Patient (John on cpap, dme report 6/3)      Referring physician:  Maria C Esteves [facility]  [address] [place] [address]     ASSESSMENT/PLAN:  1. Severe obstructive sleep apnea, sleep study 24 AHI 36  2. Hypersomnia  3. Non-restorative sleep  4. Morbid obesity (HCC)        Continue management with CPAP as she feels it helps and she is compliant.        Return in about 6 months (around 12/3/2025).    SUBJECTIVE/OBJECTIVE:        HPI    Patient presents to establish care for obstructive sleep apnea. Sleep study revealed severe sleep apnea with AHI of 36. She started using CPAP and feels it helps. My interpretation of compliance download is that she is using CPAP an average of 5 hours and 27 minutes per night. Apnea-hypopnea index with CPAP is 0.9 events per hour.       Continue the following medications as reported by the patient:    Prior to Visit Medications    Medication Sig Taking? Authorizing Provider   rizatriptan (MAXALT) 10 MG tablet TAKE 1 TABLET BY MOUTH EVERY 2 HOURS AS NEEDED FOR MIGRAINE. MAX DOSE 3/ HOURS Yes Provider, MD Jenny   FLUoxetine (PROZAC) 40 MG capsule Take by mouth daily Yes Provider, MD Jenny   omeprazole (PRILOSEC) 20 MG delayed release capsule TAKE 1 CAPSULE BY MOUTH EVERY DAY FOR 90 DAYS Yes Provider, Historical, MD   amLODIPine (NORVASC) 5 MG tablet Take 1 tablet by mouth daily Yes Provider, MD Jenny   meloxicam (MOBIC) 15 MG tablet Take 1 tablet by mouth daily Yes Provider, Historical, MD   losartan (COZAAR) 50 MG tablet Take 1 tablet by mouth daily Yes Provider, Historical, MD   atorvastatin (LIPITOR) 40 MG tablet Take 1 tablet by mouth daily Yes Provider, MD Jenny   cetirizine (ZYRTEC) 10 MG tablet Take 1 tablet by mouth daily Yes Provider, Historical, MD   OZEMPIC, 0.25 OR 0.5